# Patient Record
Sex: FEMALE | Race: BLACK OR AFRICAN AMERICAN | NOT HISPANIC OR LATINO | Employment: UNEMPLOYED | URBAN - METROPOLITAN AREA
[De-identification: names, ages, dates, MRNs, and addresses within clinical notes are randomized per-mention and may not be internally consistent; named-entity substitution may affect disease eponyms.]

---

## 2023-09-22 ENCOUNTER — HOSPITAL ENCOUNTER (INPATIENT)
Dept: DATA CONVERSION | Facility: HOSPITAL | Age: 48
LOS: 8 days | Discharge: HOME | DRG: 100 | End: 2023-09-30
Attending: STUDENT IN AN ORGANIZED HEALTH CARE EDUCATION/TRAINING PROGRAM | Admitting: STUDENT IN AN ORGANIZED HEALTH CARE EDUCATION/TRAINING PROGRAM
Payer: MEDICARE

## 2023-09-22 DIAGNOSIS — G40.901 EPILEPSY, UNSPECIFIED, NOT INTRACTABLE, WITH STATUS EPILEPTICUS (MULTI): ICD-10-CM

## 2023-09-22 DIAGNOSIS — R56.9 SEIZURE (MULTI): Primary | ICD-10-CM

## 2023-09-22 DIAGNOSIS — N13.9 URINARY (TRACT) OBSTRUCTION: ICD-10-CM

## 2023-09-22 LAB — POCT GLUCOSE: 159 MG/DL (ref 74–99)

## 2023-09-22 PROCEDURE — 82947 ASSAY GLUCOSE BLOOD QUANT: CPT

## 2023-09-22 PROCEDURE — 5A1955Z RESPIRATORY VENTILATION, GREATER THAN 96 CONSECUTIVE HOURS: ICD-10-PCS | Performed by: STUDENT IN AN ORGANIZED HEALTH CARE EDUCATION/TRAINING PROGRAM

## 2023-09-22 PROCEDURE — 9990 CHARGE CONVERSION

## 2023-09-23 LAB
ACTIVATED PARTIAL THROMBOPLASTIN TIME IN PPP BY COAGULATION ASSAY: 32 SEC (ref 27–38)
ACTIVATED PARTIAL THROMBOPLASTIN TIME IN PPP BY COAGULATION ASSAY: 32 SEC (ref 27–38)
ALBUMIN (G/DL) IN SER/PLAS: 2.9 G/DL (ref 3.4–5)
ALBUMIN (G/DL) IN SER/PLAS: 3 G/DL (ref 3.4–5)
ALBUMIN (G/DL) IN SER/PLAS: 3.3 G/DL (ref 3.4–5)
ALBUMIN (G/DL) IN SER/PLAS: NORMAL
ANION GAP IN SER/PLAS: 15 MMOL/L (ref 10–20)
ANION GAP IN SER/PLAS: NORMAL
BASOPHILS (10*3/UL) IN BLOOD BY MANUAL COUNT - WAM: 0 X10E9/L (ref 0–0.1)
BASOPHILS/100 LEUKOCYTES IN BLOOD BY MANUAL COUNT - WAM: 0 % (ref 0–2)
BURR CELLS PRESENCE IN BLOOD BY LIGHT MICROSCOPY: NORMAL
CALCIUM (MG/DL) IN SER/PLAS: 7.9 MG/DL (ref 8.6–10.6)
CALCIUM (MG/DL) IN SER/PLAS: 7.9 MG/DL (ref 8.6–10.6)
CALCIUM (MG/DL) IN SER/PLAS: 8.4 MG/DL (ref 8.6–10.6)
CALCIUM (MG/DL) IN SER/PLAS: NORMAL
CARBON DIOXIDE, TOTAL (MMOL/L) IN SER/PLAS: 18 MMOL/L (ref 21–32)
CARBON DIOXIDE, TOTAL (MMOL/L) IN SER/PLAS: 19 MMOL/L (ref 21–32)
CARBON DIOXIDE, TOTAL (MMOL/L) IN SER/PLAS: 19 MMOL/L (ref 21–32)
CARBON DIOXIDE, TOTAL (MMOL/L) IN SER/PLAS: NORMAL
CHLORIDE (MMOL/L) IN SER/PLAS: 113 MMOL/L (ref 98–107)
CHLORIDE (MMOL/L) IN SER/PLAS: 113 MMOL/L (ref 98–107)
CHLORIDE (MMOL/L) IN SER/PLAS: 114 MMOL/L (ref 98–107)
CHLORIDE (MMOL/L) IN SER/PLAS: NORMAL
CREATININE (MG/DL) IN SER/PLAS: 1.28 MG/DL (ref 0.5–1.05)
CREATININE (MG/DL) IN SER/PLAS: 1.32 MG/DL (ref 0.5–1.05)
CREATININE (MG/DL) IN SER/PLAS: 1.41 MG/DL (ref 0.5–1.05)
CREATININE (MG/DL) IN SER/PLAS: NORMAL
EOSINOPHILS (10*3/UL) IN BLOOD BY MANUAL COUNT - WAM: 0 X10E9/L (ref 0–0.7)
EOSINOPHILS/100 LEUKOCYTES IN BLOOD BY MANUAL COUNT - WAM: 0 % (ref 0–6)
ERYTHROCYTE DISTRIBUTION WIDTH (RATIO) BY AUTOMATED COUNT: 12.7 % (ref 11.5–14.5)
ERYTHROCYTE DISTRIBUTION WIDTH (RATIO) BY AUTOMATED COUNT: 12.9 % (ref 11.5–14.5)
ERYTHROCYTE MEAN CORPUSCULAR HEMOGLOBIN CONCENTRATION (G/DL) BY AUTOMATED: 31.5 G/DL (ref 32–36)
ERYTHROCYTE MEAN CORPUSCULAR HEMOGLOBIN CONCENTRATION (G/DL) BY AUTOMATED: 33.2 G/DL (ref 32–36)
ERYTHROCYTE MEAN CORPUSCULAR VOLUME (FL) BY AUTOMATED COUNT: 93 FL (ref 80–100)
ERYTHROCYTE MEAN CORPUSCULAR VOLUME (FL) BY AUTOMATED COUNT: 95 FL (ref 80–100)
ERYTHROCYTES (10*6/UL) IN BLOOD BY AUTOMATED COUNT: 4.2 X10E12/L (ref 4–5.2)
ERYTHROCYTES (10*6/UL) IN BLOOD BY AUTOMATED COUNT: 4.51 X10E12/L (ref 4–5.2)
GFR FEMALE: 45 ML/MIN/1.73M2
GFR FEMALE: 49 ML/MIN/1.73M2
GFR FEMALE: 51 ML/MIN/1.73M2
GFR FEMALE: NORMAL
GFR MALE: NORMAL
GLUCOSE (MG/DL) IN SER/PLAS: 192 MG/DL (ref 74–99)
GLUCOSE (MG/DL) IN SER/PLAS: 202 MG/DL (ref 74–99)
GLUCOSE (MG/DL) IN SER/PLAS: 77 MG/DL (ref 74–99)
GLUCOSE (MG/DL) IN SER/PLAS: NORMAL
HEMATOCRIT (%) IN BLOOD BY AUTOMATED COUNT: 39.1 % (ref 36–46)
HEMATOCRIT (%) IN BLOOD BY AUTOMATED COUNT: 42.9 % (ref 36–46)
HEMOGLOBIN (G/DL) IN BLOOD: 13 G/DL (ref 12–16)
HEMOGLOBIN (G/DL) IN BLOOD: 13.5 G/DL (ref 12–16)
IMMATURE GRANULOCYTES/100 LEUKOCYTES IN BLOOD BY AUTOMATED COUNT: 2.1 % (ref 0–0.9)
INR IN PPP BY COAGULATION ASSAY: 1.3 (ref 0.9–1.1)
INR IN PPP BY COAGULATION ASSAY: 1.3 (ref 0.9–1.1)
KEPPRA: 91 UG/ML (ref 10–40)
LEUKOCYTES (10*3/UL) IN BLOOD BY AUTOMATED COUNT: 17.2 X10E9/L (ref 4.4–11.3)
LEUKOCYTES (10*3/UL) IN BLOOD BY AUTOMATED COUNT: 22.3 X10E9/L (ref 4.4–11.3)
LYMPHOCYTES (10*3/UL) IN BLOOD BY MANUAL COUNT - WAM: 0.18 X10E9/L (ref 1.2–4.8)
LYMPHOCYTES/100 LEUKOCYTES IN BLOOD BY MANUAL COUNT - WAM: 0.8 % (ref 13–44)
MAGNESIUM (MG/DL) IN SER/PLAS: 1.68 MG/DL (ref 1.6–2.4)
MAGNESIUM (MG/DL) IN SER/PLAS: 1.79 MG/DL (ref 1.6–2.4)
METAMYELOCYTES (10*3/UL) IN BLOOD BY MANUAL COUNT - WAM: 0.2 X10E9/L (ref 0–0)
METAMYELOCYTES/100 LEUKOCYTES IN BLOOD BY MANUAL COUNT - WAM: 0.9 % (ref 0–0)
MONOCYTES (10*3/UL) IN BLOOD BY MANUAL COUNT - WAM: 0.76 X10E9/L (ref 0.1–1)
MONOCYTES/100 LEUKOCYTES IN BLOOD BY MANUAL COUNT - WAM: 3.4 % (ref 2–10)
NEUTROPHILS (SEGS+BANDS) (10*3/UL) MANUAL COUNT - WAM: 21.16 X10E9/L (ref 1.2–7.7)
NRBC (PER 100 WBCS) BY AUTOMATED COUNT: 0 /100 WBC (ref 0–0)
NRBC (PER 100 WBCS) BY AUTOMATED COUNT: 0 /100 WBC (ref 0–0)
PHOSPHATE (MG/DL) IN SER/PLAS: 1.9 MG/DL (ref 2.5–4.9)
PHOSPHATE (MG/DL) IN SER/PLAS: 2.3 MG/DL (ref 2.5–4.9)
PHOSPHATE (MG/DL) IN SER/PLAS: 2.4 MG/DL (ref 2.5–4.9)
PHOSPHATE (MG/DL) IN SER/PLAS: NORMAL
PLATELETS (10*3/UL) IN BLOOD AUTOMATED COUNT: 134 X10E9/L (ref 150–450)
PLATELETS (10*3/UL) IN BLOOD AUTOMATED COUNT: 171 X10E9/L (ref 150–450)
POC CALCIUM IONIZED (MMOL/L) IN BLOOD: 1.14 MMOL/L (ref 1.1–1.33)
POC CALCIUM IONIZED (MMOL/L) IN BLOOD: 1.14 MMOL/L (ref 1.1–1.33)
POCT ANION GAP, ARTERIAL: 10 MMOL/L (ref 10–25)
POCT BASE EXCESS, ARTERIAL: -4.5 MMOL/L (ref -2–3)
POCT CHLORIDE, ARTERIAL: 114 MMOL/L (ref 98–107)
POCT GLUCOSE, ARTERIAL: 87 MG/DL (ref 74–99)
POCT GLUCOSE: 156 MG/DL (ref 74–99)
POCT GLUCOSE: 171 MG/DL (ref 74–99)
POCT GLUCOSE: 58 MG/DL (ref 74–99)
POCT GLUCOSE: 72 MG/DL (ref 74–99)
POCT GLUCOSE: 74 MG/DL (ref 74–99)
POCT GLUCOSE: 87 MG/DL (ref 74–99)
POCT GLUCOSE: 90 MG/DL (ref 74–99)
POCT GLUCOSE: 94 MG/DL (ref 74–99)
POCT HCO3, ARTERIAL: 18.3 MMOL/L (ref 22–26)
POCT HEMATOCRIT, ARTERIAL: 38 % (ref 36–46)
POCT HEMOGLOBIN, ARTERIAL: 12.7 G/DL (ref 12–16)
POCT IONIZED CALCIUM, ARTERIAL: 1.14 MMOL/L (ref 1.1–1.33)
POCT LACTATE, ARTERIAL: 0.7 MMOL/L (ref 0.4–2)
POCT OXY HEMOGLOBIN, ARTERIAL: 97.9 % (ref 94–98)
POCT PCO2, ARTERIAL: 27 MMHG (ref 38–42)
POCT PH, ARTERIAL: 7.44 (ref 7.38–7.42)
POCT PO2, ARTERIAL: 145 MMHG (ref 85–95)
POCT POTASSIUM, ARTERIAL: 3.4 MMOL/L (ref 3.5–5.3)
POCT SO2, ARTERIAL: 100 % (ref 94–100)
POCT SODIUM, ARTERIAL: 139 MMOL/L (ref 136–145)
POTASSIUM (MMOL/L) IN SER/PLAS: 3.3 MMOL/L (ref 3.5–5.3)
POTASSIUM (MMOL/L) IN SER/PLAS: 3.5 MMOL/L (ref 3.5–5.3)
POTASSIUM (MMOL/L) IN SER/PLAS: 3.6 MMOL/L (ref 3.5–5.3)
POTASSIUM (MMOL/L) IN SER/PLAS: NORMAL
PROTHROMBIN TIME (PT) IN PPP BY COAGULATION ASSAY: 14.1 SEC (ref 9.8–12.8)
PROTHROMBIN TIME (PT) IN PPP BY COAGULATION ASSAY: 15 SEC (ref 9.8–12.8)
RBC MORPHOLOGY IN BLOOD: NORMAL
SEGMENTED NEUTROPHILS (10*3/UL) BLOOD MANUAL - WAM: 21.16 X10E9/L (ref 1.2–7)
SEGMENTED NEUTROPHILS/100 LEUKOCYTES BY MANUAL COUNT -: 94.9 % (ref 40–80)
SODIUM (MMOL/L) IN SER/PLAS: 143 MMOL/L (ref 136–145)
SODIUM (MMOL/L) IN SER/PLAS: 143 MMOL/L (ref 136–145)
SODIUM (MMOL/L) IN SER/PLAS: 144 MMOL/L (ref 136–145)
SODIUM (MMOL/L) IN SER/PLAS: NORMAL
TOPIRAMATE LEVEL: 12.8 UG/ML (ref 2–25)
UREA NITROGEN (MG/DL) IN SER/PLAS: 18 MG/DL (ref 6–23)
UREA NITROGEN (MG/DL) IN SER/PLAS: 20 MG/DL (ref 6–23)
UREA NITROGEN (MG/DL) IN SER/PLAS: 21 MG/DL (ref 6–23)
UREA NITROGEN (MG/DL) IN SER/PLAS: NORMAL

## 2023-09-23 PROCEDURE — 80177 DRUG SCRN QUAN LEVETIRACETAM: CPT | Mod: 90

## 2023-09-23 PROCEDURE — 82805 BLOOD GASES W/O2 SATURATION: CPT

## 2023-09-23 PROCEDURE — 95716 VEEG EA 12-26HR CONT MNTR: CPT

## 2023-09-23 PROCEDURE — 83605 ASSAY OF LACTIC ACID: CPT

## 2023-09-23 PROCEDURE — 95700 EEG CONT REC W/VID EEG TECH: CPT

## 2023-09-23 PROCEDURE — 85027 COMPLETE CBC AUTOMATED: CPT

## 2023-09-23 PROCEDURE — 85610 PROTHROMBIN TIME: CPT | Mod: 91

## 2023-09-23 PROCEDURE — 83735 ASSAY OF MAGNESIUM: CPT

## 2023-09-23 PROCEDURE — 71045 X-RAY EXAM CHEST 1 VIEW: CPT

## 2023-09-23 PROCEDURE — 84295 ASSAY OF SERUM SODIUM: CPT | Mod: 91

## 2023-09-23 PROCEDURE — 9990 CHARGE CONVERSION

## 2023-09-23 PROCEDURE — 82330 ASSAY OF CALCIUM: CPT | Mod: 91

## 2023-09-23 PROCEDURE — 87081 CULTURE SCREEN ONLY: CPT

## 2023-09-23 PROCEDURE — 84132 ASSAY OF SERUM POTASSIUM: CPT | Mod: 91

## 2023-09-23 PROCEDURE — 82435 ASSAY OF BLOOD CHLORIDE: CPT | Mod: 91

## 2023-09-23 PROCEDURE — 80201 ASSAY OF TOPIRAMATE: CPT | Mod: 90

## 2023-09-23 PROCEDURE — 85730 THROMBOPLASTIN TIME PARTIAL: CPT | Mod: 91

## 2023-09-23 PROCEDURE — 94002 VENT MGMT INPAT INIT DAY: CPT

## 2023-09-23 PROCEDURE — 85018 HEMOGLOBIN: CPT | Mod: 91

## 2023-09-23 PROCEDURE — 80069 RENAL FUNCTION PANEL: CPT | Mod: 91

## 2023-09-23 PROCEDURE — 87040 BLOOD CULTURE FOR BACTERIA: CPT | Mod: 59

## 2023-09-23 PROCEDURE — 82947 ASSAY GLUCOSE BLOOD QUANT: CPT | Mod: 91

## 2023-09-24 LAB
ACTIVATED PARTIAL THROMBOPLASTIN TIME IN PPP BY COAGULATION ASSAY: 34 SEC (ref 27–38)
ACTIVATED PARTIAL THROMBOPLASTIN TIME IN PPP BY COAGULATION ASSAY: NORMAL
ACTIVATED PARTIAL THROMBOPLASTIN TIME IN PPP BY COAGULATION ASSAY: NORMAL
ALBUMIN (G/DL) IN SER/PLAS: 2.8 G/DL (ref 3.4–5)
ALBUMIN (G/DL) IN SER/PLAS: NORMAL
ALBUMIN (G/DL) IN SER/PLAS: NORMAL
ANION GAP IN SER/PLAS: 13 MMOL/L (ref 10–20)
ANION GAP IN SER/PLAS: NORMAL
ANION GAP IN SER/PLAS: NORMAL
CALCIUM (MG/DL) IN SER/PLAS: 7.7 MG/DL (ref 8.6–10.6)
CALCIUM (MG/DL) IN SER/PLAS: NORMAL
CALCIUM (MG/DL) IN SER/PLAS: NORMAL
CARBON DIOXIDE, TOTAL (MMOL/L) IN SER/PLAS: 19 MMOL/L (ref 21–32)
CARBON DIOXIDE, TOTAL (MMOL/L) IN SER/PLAS: NORMAL
CARBON DIOXIDE, TOTAL (MMOL/L) IN SER/PLAS: NORMAL
CHLORIDE (MMOL/L) IN SER/PLAS: 114 MMOL/L (ref 98–107)
CHLORIDE (MMOL/L) IN SER/PLAS: NORMAL
CHLORIDE (MMOL/L) IN SER/PLAS: NORMAL
CREATININE (MG/DL) IN SER/PLAS: 1.24 MG/DL (ref 0.5–1.05)
CREATININE (MG/DL) IN SER/PLAS: NORMAL
CREATININE (MG/DL) IN SER/PLAS: NORMAL
ERYTHROCYTE DISTRIBUTION WIDTH (RATIO) BY AUTOMATED COUNT: 13 % (ref 11.5–14.5)
ERYTHROCYTE DISTRIBUTION WIDTH (RATIO) BY AUTOMATED COUNT: NORMAL
ERYTHROCYTE DISTRIBUTION WIDTH (RATIO) BY AUTOMATED COUNT: NORMAL
ERYTHROCYTE MEAN CORPUSCULAR HEMOGLOBIN CONCENTRATION (G/DL) BY AUTOMATED: 31.5 G/DL (ref 32–36)
ERYTHROCYTE MEAN CORPUSCULAR HEMOGLOBIN CONCENTRATION (G/DL) BY AUTOMATED: NORMAL
ERYTHROCYTE MEAN CORPUSCULAR HEMOGLOBIN CONCENTRATION (G/DL) BY AUTOMATED: NORMAL
ERYTHROCYTE MEAN CORPUSCULAR VOLUME (FL) BY AUTOMATED COUNT: 92 FL (ref 80–100)
ERYTHROCYTE MEAN CORPUSCULAR VOLUME (FL) BY AUTOMATED COUNT: NORMAL
ERYTHROCYTE MEAN CORPUSCULAR VOLUME (FL) BY AUTOMATED COUNT: NORMAL
ERYTHROCYTES (10*6/UL) IN BLOOD BY AUTOMATED COUNT: 3.9 X10E12/L (ref 4–5.2)
ERYTHROCYTES (10*6/UL) IN BLOOD BY AUTOMATED COUNT: NORMAL
ERYTHROCYTES (10*6/UL) IN BLOOD BY AUTOMATED COUNT: NORMAL
GFR FEMALE: 53 ML/MIN/1.73M2
GFR FEMALE: NORMAL
GFR FEMALE: NORMAL
GFR MALE: NORMAL
GFR MALE: NORMAL
GLUCOSE (MG/DL) IN SER/PLAS: 99 MG/DL (ref 74–99)
GLUCOSE (MG/DL) IN SER/PLAS: NORMAL
GLUCOSE (MG/DL) IN SER/PLAS: NORMAL
HEMATOCRIT (%) IN BLOOD BY AUTOMATED COUNT: 35.9 % (ref 36–46)
HEMATOCRIT (%) IN BLOOD BY AUTOMATED COUNT: NORMAL
HEMATOCRIT (%) IN BLOOD BY AUTOMATED COUNT: NORMAL
HEMOGLOBIN (G/DL) IN BLOOD: 11.3 G/DL (ref 12–16)
HEMOGLOBIN (G/DL) IN BLOOD: NORMAL
HEMOGLOBIN (G/DL) IN BLOOD: NORMAL
INR IN PPP BY COAGULATION ASSAY: 1.3 (ref 0.9–1.1)
INR IN PPP BY COAGULATION ASSAY: NORMAL
INR IN PPP BY COAGULATION ASSAY: NORMAL
KEPPRA: 73 UG/ML (ref 10–40)
KEPPRA: NORMAL
LEUKOCYTES (10*3/UL) IN BLOOD BY AUTOMATED COUNT: 9.8 X10E9/L (ref 4.4–11.3)
LEUKOCYTES (10*3/UL) IN BLOOD BY AUTOMATED COUNT: NORMAL
LEUKOCYTES (10*3/UL) IN BLOOD BY AUTOMATED COUNT: NORMAL
MAGNESIUM (MG/DL) IN SER/PLAS: 1.71 MG/DL (ref 1.6–2.4)
MAGNESIUM (MG/DL) IN SER/PLAS: NORMAL
MAGNESIUM (MG/DL) IN SER/PLAS: NORMAL
NRBC (PER 100 WBCS) BY AUTOMATED COUNT: 0 /100 WBC (ref 0–0)
NRBC (PER 100 WBCS) BY AUTOMATED COUNT: NORMAL
NRBC (PER 100 WBCS) BY AUTOMATED COUNT: NORMAL
PHOSPHATE (MG/DL) IN SER/PLAS: 1.9 MG/DL (ref 2.5–4.9)
PHOSPHATE (MG/DL) IN SER/PLAS: NORMAL
PHOSPHATE (MG/DL) IN SER/PLAS: NORMAL
PLATELETS (10*3/UL) IN BLOOD AUTOMATED COUNT: 114 X10E9/L (ref 150–450)
PLATELETS (10*3/UL) IN BLOOD AUTOMATED COUNT: NORMAL
PLATELETS (10*3/UL) IN BLOOD AUTOMATED COUNT: NORMAL
POC CALCIUM IONIZED (MMOL/L) IN BLOOD: 1.09 MMOL/L (ref 1.1–1.33)
POC CALCIUM IONIZED (MMOL/L) IN BLOOD: NORMAL
POC CALCIUM IONIZED (MMOL/L) IN BLOOD: NORMAL
POCT GLUCOSE: 102 MG/DL (ref 74–99)
POCT GLUCOSE: 80 MG/DL (ref 74–99)
POCT GLUCOSE: 93 MG/DL (ref 74–99)
POCT GLUCOSE: 98 MG/DL (ref 74–99)
POCT GLUCOSE: 98 MG/DL (ref 74–99)
POCT GLUCOSE: 99 MG/DL (ref 74–99)
POTASSIUM (MMOL/L) IN SER/PLAS: 3.5 MMOL/L (ref 3.5–5.3)
POTASSIUM (MMOL/L) IN SER/PLAS: NORMAL
POTASSIUM (MMOL/L) IN SER/PLAS: NORMAL
PROTHROMBIN TIME (PT) IN PPP BY COAGULATION ASSAY: 15.2 SEC (ref 9.8–12.8)
PROTHROMBIN TIME (PT) IN PPP BY COAGULATION ASSAY: NORMAL
PROTHROMBIN TIME (PT) IN PPP BY COAGULATION ASSAY: NORMAL
SODIUM (MMOL/L) IN SER/PLAS: 142 MMOL/L (ref 136–145)
SODIUM (MMOL/L) IN SER/PLAS: NORMAL
SODIUM (MMOL/L) IN SER/PLAS: NORMAL
STAPH/MRSA SCREEN, CULTURE: NORMAL
TOPIRAMATE LEVEL: NORMAL
UREA NITROGEN (MG/DL) IN SER/PLAS: 14 MG/DL (ref 6–23)
UREA NITROGEN (MG/DL) IN SER/PLAS: NORMAL
UREA NITROGEN (MG/DL) IN SER/PLAS: NORMAL
VANCOMYCIN (UG/ML) IN SER/PLAS: 15.2 UG/ML

## 2023-09-24 PROCEDURE — 82805 BLOOD GASES W/O2 SATURATION: CPT

## 2023-09-24 PROCEDURE — 85610 PROTHROMBIN TIME: CPT

## 2023-09-24 PROCEDURE — 87040 BLOOD CULTURE FOR BACTERIA: CPT | Mod: 59

## 2023-09-24 PROCEDURE — 95716 VEEG EA 12-26HR CONT MNTR: CPT

## 2023-09-24 PROCEDURE — 82330 ASSAY OF CALCIUM: CPT

## 2023-09-24 PROCEDURE — 82947 ASSAY GLUCOSE BLOOD QUANT: CPT | Mod: 91

## 2023-09-24 PROCEDURE — 74018 RADEX ABDOMEN 1 VIEW: CPT | Mod: 76

## 2023-09-24 PROCEDURE — 87081 CULTURE SCREEN ONLY: CPT

## 2023-09-24 PROCEDURE — 83735 ASSAY OF MAGNESIUM: CPT

## 2023-09-24 PROCEDURE — 80202 ASSAY OF VANCOMYCIN: CPT

## 2023-09-24 PROCEDURE — 82435 ASSAY OF BLOOD CHLORIDE: CPT | Mod: 91

## 2023-09-24 PROCEDURE — 83605 ASSAY OF LACTIC ACID: CPT

## 2023-09-24 PROCEDURE — 9990 CHARGE CONVERSION: Mod: 90

## 2023-09-24 PROCEDURE — 84132 ASSAY OF SERUM POTASSIUM: CPT | Mod: 91

## 2023-09-24 PROCEDURE — 85027 COMPLETE CBC AUTOMATED: CPT

## 2023-09-24 PROCEDURE — 85018 HEMOGLOBIN: CPT | Mod: 91

## 2023-09-24 PROCEDURE — 80069 RENAL FUNCTION PANEL: CPT

## 2023-09-24 PROCEDURE — 80177 DRUG SCRN QUAN LEVETIRACETAM: CPT | Mod: 90

## 2023-09-24 PROCEDURE — 84295 ASSAY OF SERUM SODIUM: CPT | Mod: 91

## 2023-09-24 PROCEDURE — 94003 VENT MGMT INPAT SUBQ DAY: CPT

## 2023-09-24 PROCEDURE — 80201 ASSAY OF TOPIRAMATE: CPT | Mod: 90

## 2023-09-24 PROCEDURE — 85730 THROMBOPLASTIN TIME PARTIAL: CPT

## 2023-09-25 LAB
ACTIVATED PARTIAL THROMBOPLASTIN TIME IN PPP BY COAGULATION ASSAY: 34 SEC (ref 27–38)
ALBUMIN (G/DL) IN SER/PLAS: 2.8 G/DL (ref 3.4–5)
ANION GAP IN SER/PLAS: 14 MMOL/L (ref 10–20)
APPEARANCE, URINE: CLEAR
BASOPHILIC STIPPLING PRESENCE IN BLOOD BY LIGHT MICROSCOPY: PRESENT
BASOPHILS (10*3/UL) IN BLOOD BY MANUAL COUNT - WAM: 0 X10E9/L (ref 0–0.1)
BASOPHILS/100 LEUKOCYTES IN BLOOD BY MANUAL COUNT - WAM: 0 % (ref 0–2)
BILIRUBIN, URINE: NEGATIVE
BLOOD, URINE: ABNORMAL
BURR CELLS PRESENCE IN BLOOD BY LIGHT MICROSCOPY: NORMAL
CALCIUM (MG/DL) IN SER/PLAS: 7.7 MG/DL (ref 8.6–10.6)
CARBON DIOXIDE, TOTAL (MMOL/L) IN SER/PLAS: 18 MMOL/L (ref 21–32)
CHLORIDE (MMOL/L) IN SER/PLAS: 114 MMOL/L (ref 98–107)
COLOR, URINE: YELLOW
CREATININE (MG/DL) IN SER/PLAS: 1.24 MG/DL (ref 0.5–1.05)
EOSINOPHILS (10*3/UL) IN BLOOD BY MANUAL COUNT - WAM: 0 X10E9/L (ref 0–0.7)
EOSINOPHILS/100 LEUKOCYTES IN BLOOD BY MANUAL COUNT - WAM: 0 % (ref 0–6)
ERYTHROCYTE DISTRIBUTION WIDTH (RATIO) BY AUTOMATED COUNT: 12.8 % (ref 11.5–14.5)
ERYTHROCYTE MEAN CORPUSCULAR HEMOGLOBIN CONCENTRATION (G/DL) BY AUTOMATED: 33.6 G/DL (ref 32–36)
ERYTHROCYTE MEAN CORPUSCULAR VOLUME (FL) BY AUTOMATED COUNT: 93 FL (ref 80–100)
ERYTHROCYTES (10*6/UL) IN BLOOD BY AUTOMATED COUNT: 3.75 X10E12/L (ref 4–5.2)
GFR FEMALE: 53 ML/MIN/1.73M2
GLUCOSE (MG/DL) IN SER/PLAS: 92 MG/DL (ref 74–99)
GLUCOSE, URINE: NEGATIVE MG/DL
HEMATOCRIT (%) IN BLOOD BY AUTOMATED COUNT: 34.8 % (ref 36–46)
HEMOGLOBIN (G/DL) IN BLOOD: 11.7 G/DL (ref 12–16)
IMMATURE GRANULOCYTES/100 LEUKOCYTES IN BLOOD BY AUTOMATED COUNT: 0.3 % (ref 0–0.9)
INR IN PPP BY COAGULATION ASSAY: 1.2 (ref 0.9–1.1)
KEPPRA: 80 UG/ML (ref 10–40)
KETONES, URINE: ABNORMAL MG/DL
LEUKOCYTE ESTERASE, URINE: ABNORMAL
LEUKOCYTES (10*3/UL) IN BLOOD BY AUTOMATED COUNT: 6.5 X10E9/L (ref 4.4–11.3)
LYMPHOCYTES (10*3/UL) IN BLOOD BY MANUAL COUNT - WAM: 0.28 X10E9/L (ref 1.2–4.8)
LYMPHOCYTES/100 LEUKOCYTES IN BLOOD BY MANUAL COUNT - WAM: 4.3 % (ref 13–44)
MAGNESIUM (MG/DL) IN SER/PLAS: 2 MG/DL (ref 1.6–2.4)
MANUAL DIFFERENTIAL Y/N: ABNORMAL
MONOCYTES (10*3/UL) IN BLOOD BY MANUAL COUNT - WAM: 0.05 X10E9/L (ref 0.1–1)
MONOCYTES/100 LEUKOCYTES IN BLOOD BY MANUAL COUNT - WAM: 0.8 % (ref 2–10)
NEUTROPHILS (SEGS+BANDS) (10*3/UL) MANUAL COUNT - WAM: 6.17 X10E9/L (ref 1.2–7.7)
NITRITE, URINE: NEGATIVE
NRBC (PER 100 WBCS) BY AUTOMATED COUNT: 0 /100 WBC (ref 0–0)
PH, URINE: 6 (ref 5–8)
PHOSPHATE (MG/DL) IN SER/PLAS: 3 MG/DL (ref 2.5–4.9)
PLATELETS (10*3/UL) IN BLOOD AUTOMATED COUNT: 95 X10E9/L (ref 150–450)
POC CALCIUM IONIZED (MMOL/L) IN BLOOD: 1.12 MMOL/L (ref 1.1–1.33)
POCT GLUCOSE: 101 MG/DL (ref 74–99)
POCT GLUCOSE: 110 MG/DL (ref 74–99)
POCT GLUCOSE: 121 MG/DL (ref 74–99)
POCT GLUCOSE: 94 MG/DL (ref 74–99)
POCT GLUCOSE: 96 MG/DL (ref 74–99)
POTASSIUM (MMOL/L) IN SER/PLAS: 3.5 MMOL/L (ref 3.5–5.3)
PROTEIN, URINE: NEGATIVE MG/DL
PROTHROMBIN TIME (PT) IN PPP BY COAGULATION ASSAY: 13 SEC (ref 9.8–12.8)
RBC MORPHOLOGY IN BLOOD: NORMAL
RBC, URINE: 1 /HPF (ref 0–5)
SEGMENTED NEUTROPHILS (10*3/UL) BLOOD MANUAL - WAM: 6.17 X10E9/L (ref 1.2–7)
SEGMENTED NEUTROPHILS/100 LEUKOCYTES BY MANUAL COUNT -: 94.9 % (ref 40–80)
SODIUM (MMOL/L) IN SER/PLAS: 142 MMOL/L (ref 136–145)
SPECIFIC GRAVITY, URINE: 1.01 (ref 1–1.03)
SQUAMOUS EPITHELIAL CELLS, URINE: <1 /HPF
TOPIRAMATE LEVEL: 14.3 UG/ML (ref 2–25)
TRANSITIONAL EPITHELIAL CELLS, URINE: <1 /HPF
UREA NITROGEN (MG/DL) IN SER/PLAS: 10 MG/DL (ref 6–23)
UROBILINOGEN, URINE: <2 MG/DL (ref 0–1.9)
WBC, URINE: 6 /HPF (ref 0–5)

## 2023-09-25 PROCEDURE — 82330 ASSAY OF CALCIUM: CPT

## 2023-09-25 PROCEDURE — 83735 ASSAY OF MAGNESIUM: CPT

## 2023-09-25 PROCEDURE — 71045 X-RAY EXAM CHEST 1 VIEW: CPT

## 2023-09-25 PROCEDURE — 82947 ASSAY GLUCOSE BLOOD QUANT: CPT | Mod: 91

## 2023-09-25 PROCEDURE — 85610 PROTHROMBIN TIME: CPT

## 2023-09-25 PROCEDURE — 80202 ASSAY OF VANCOMYCIN: CPT

## 2023-09-25 PROCEDURE — 94003 VENT MGMT INPAT SUBQ DAY: CPT

## 2023-09-25 PROCEDURE — 97530 THERAPEUTIC ACTIVITIES: CPT | Mod: GP

## 2023-09-25 PROCEDURE — 97166 OT EVAL MOD COMPLEX 45 MIN: CPT | Mod: GO

## 2023-09-25 PROCEDURE — 80201 ASSAY OF TOPIRAMATE: CPT | Mod: 90

## 2023-09-25 PROCEDURE — 80069 RENAL FUNCTION PANEL: CPT | Mod: 91

## 2023-09-25 PROCEDURE — 81001 URINALYSIS AUTO W/SCOPE: CPT

## 2023-09-25 PROCEDURE — 97162 PT EVAL MOD COMPLEX 30 MIN: CPT | Mod: GP

## 2023-09-25 PROCEDURE — 85730 THROMBOPLASTIN TIME PARTIAL: CPT

## 2023-09-25 PROCEDURE — 87086 URINE CULTURE/COLONY COUNT: CPT

## 2023-09-25 PROCEDURE — 85027 COMPLETE CBC AUTOMATED: CPT | Mod: 91

## 2023-09-25 PROCEDURE — 9990 CHARGE CONVERSION

## 2023-09-25 PROCEDURE — 95716 VEEG EA 12-26HR CONT MNTR: CPT

## 2023-09-25 PROCEDURE — 80177 DRUG SCRN QUAN LEVETIRACETAM: CPT | Mod: 90,91

## 2023-09-25 PROCEDURE — 85025 COMPLETE CBC W/AUTO DIFF WBC: CPT

## 2023-09-26 LAB
ALBUMIN (G/DL) IN SER/PLAS: 2.9 G/DL (ref 3.4–5)
ANION GAP IN SER/PLAS: 15 MMOL/L (ref 10–20)
CALCIUM (MG/DL) IN SER/PLAS: 8 MG/DL (ref 8.6–10.6)
CARBON DIOXIDE, TOTAL (MMOL/L) IN SER/PLAS: 15 MMOL/L (ref 21–32)
CHLORIDE (MMOL/L) IN SER/PLAS: 114 MMOL/L (ref 98–107)
CREATININE (MG/DL) IN SER/PLAS: 0.97 MG/DL (ref 0.5–1.05)
ERYTHROCYTE DISTRIBUTION WIDTH (RATIO) BY AUTOMATED COUNT: 12.3 % (ref 11.5–14.5)
ERYTHROCYTE MEAN CORPUSCULAR HEMOGLOBIN CONCENTRATION (G/DL) BY AUTOMATED: 30.7 G/DL (ref 32–36)
ERYTHROCYTE MEAN CORPUSCULAR VOLUME (FL) BY AUTOMATED COUNT: 95 FL (ref 80–100)
ERYTHROCYTES (10*6/UL) IN BLOOD BY AUTOMATED COUNT: 4.31 X10E12/L (ref 4–5.2)
GFR FEMALE: 71 ML/MIN/1.73M2
GLUCOSE (MG/DL) IN SER/PLAS: 127 MG/DL (ref 74–99)
HEMATOCRIT (%) IN BLOOD BY AUTOMATED COUNT: 41 % (ref 36–46)
HEMOGLOBIN (G/DL) IN BLOOD: 12.6 G/DL (ref 12–16)
KEPPRA: 88 UG/ML (ref 10–40)
LEUKOCYTES (10*3/UL) IN BLOOD BY AUTOMATED COUNT: 7 X10E9/L (ref 4.4–11.3)
MAGNESIUM (MG/DL) IN SER/PLAS: 2.01 MG/DL (ref 1.6–2.4)
NRBC (PER 100 WBCS) BY AUTOMATED COUNT: 0 /100 WBC (ref 0–0)
PHOSPHATE (MG/DL) IN SER/PLAS: 3.6 MG/DL (ref 2.5–4.9)
PLATELETS (10*3/UL) IN BLOOD AUTOMATED COUNT: 122 X10E9/L (ref 150–450)
POCT GLUCOSE: 106 MG/DL (ref 74–99)
POCT GLUCOSE: 116 MG/DL (ref 74–99)
POCT GLUCOSE: 118 MG/DL (ref 74–99)
POCT GLUCOSE: 124 MG/DL (ref 74–99)
POCT GLUCOSE: 130 MG/DL (ref 74–99)
POCT GLUCOSE: 137 MG/DL (ref 74–99)
POCT GLUCOSE: 147 MG/DL (ref 74–99)
POTASSIUM (MMOL/L) IN SER/PLAS: 3.9 MMOL/L (ref 3.5–5.3)
SODIUM (MMOL/L) IN SER/PLAS: 140 MMOL/L (ref 136–145)
TOPIRAMATE LEVEL: 13.8 UG/ML (ref 2–25)
UREA NITROGEN (MG/DL) IN SER/PLAS: 11 MG/DL (ref 6–23)
URINE CULTURE: NO GROWTH

## 2023-09-26 PROCEDURE — 9990 CHARGE CONVERSION: Mod: GO

## 2023-09-26 PROCEDURE — 85610 PROTHROMBIN TIME: CPT

## 2023-09-26 PROCEDURE — 94799 UNLISTED PULMONARY SVC/PX: CPT

## 2023-09-26 PROCEDURE — 74018 RADEX ABDOMEN 1 VIEW: CPT

## 2023-09-26 PROCEDURE — 80177 DRUG SCRN QUAN LEVETIRACETAM: CPT | Mod: 90

## 2023-09-26 PROCEDURE — 3E0G76Z INTRODUCTION OF NUTRITIONAL SUBSTANCE INTO UPPER GI, VIA NATURAL OR ARTIFICIAL OPENING: ICD-10-PCS | Performed by: STUDENT IN AN ORGANIZED HEALTH CARE EDUCATION/TRAINING PROGRAM

## 2023-09-26 PROCEDURE — 94668 MNPJ CHEST WALL SBSQ: CPT

## 2023-09-26 PROCEDURE — 80069 RENAL FUNCTION PANEL: CPT | Mod: 91

## 2023-09-26 PROCEDURE — 82947 ASSAY GLUCOSE BLOOD QUANT: CPT

## 2023-09-26 PROCEDURE — 97166 OT EVAL MOD COMPLEX 45 MIN: CPT | Mod: GO

## 2023-09-26 PROCEDURE — 94003 VENT MGMT INPAT SUBQ DAY: CPT

## 2023-09-26 PROCEDURE — 82330 ASSAY OF CALCIUM: CPT

## 2023-09-26 PROCEDURE — 85730 THROMBOPLASTIN TIME PARTIAL: CPT

## 2023-09-26 PROCEDURE — 85025 COMPLETE CBC W/AUTO DIFF WBC: CPT

## 2023-09-26 PROCEDURE — 83735 ASSAY OF MAGNESIUM: CPT | Mod: 91

## 2023-09-26 PROCEDURE — 95716 VEEG EA 12-26HR CONT MNTR: CPT

## 2023-09-26 PROCEDURE — 97530 THERAPEUTIC ACTIVITIES: CPT | Mod: GO

## 2023-09-26 PROCEDURE — 97162 PT EVAL MOD COMPLEX 30 MIN: CPT | Mod: GP

## 2023-09-26 PROCEDURE — 80201 ASSAY OF TOPIRAMATE: CPT | Mod: 90,91

## 2023-09-27 LAB
ALBUMIN (G/DL) IN SER/PLAS: 2.7 G/DL (ref 3.4–5)
ANION GAP IN SER/PLAS: 14 MMOL/L (ref 10–20)
BLOOD CULTURE: NORMAL
CALCIUM (MG/DL) IN SER/PLAS: 7.6 MG/DL (ref 8.6–10.6)
CARBON DIOXIDE, TOTAL (MMOL/L) IN SER/PLAS: 19 MMOL/L (ref 21–32)
CHLORIDE (MMOL/L) IN SER/PLAS: 113 MMOL/L (ref 98–107)
CREATININE (MG/DL) IN SER/PLAS: 1.12 MG/DL (ref 0.5–1.05)
ERYTHROCYTE DISTRIBUTION WIDTH (RATIO) BY AUTOMATED COUNT: 12.4 % (ref 11.5–14.5)
ERYTHROCYTE MEAN CORPUSCULAR HEMOGLOBIN CONCENTRATION (G/DL) BY AUTOMATED: 32.8 G/DL (ref 32–36)
ERYTHROCYTE MEAN CORPUSCULAR VOLUME (FL) BY AUTOMATED COUNT: 91 FL (ref 80–100)
ERYTHROCYTES (10*6/UL) IN BLOOD BY AUTOMATED COUNT: 4.04 X10E12/L (ref 4–5.2)
GFR FEMALE: 60 ML/MIN/1.73M2
GLUCOSE (MG/DL) IN SER/PLAS: 130 MG/DL (ref 74–99)
HEMATOCRIT (%) IN BLOOD BY AUTOMATED COUNT: 36.6 % (ref 36–46)
HEMOGLOBIN (G/DL) IN BLOOD: 12 G/DL (ref 12–16)
KEPPRA: 74 UG/ML (ref 10–40)
KEPPRA: 78 UG/ML (ref 10–40)
LEUKOCYTES (10*3/UL) IN BLOOD BY AUTOMATED COUNT: 9 X10E9/L (ref 4.4–11.3)
MAGNESIUM (MG/DL) IN SER/PLAS: 2 MG/DL (ref 1.6–2.4)
NRBC (PER 100 WBCS) BY AUTOMATED COUNT: 0 /100 WBC (ref 0–0)
PHOSPHATE (MG/DL) IN SER/PLAS: 2.6 MG/DL (ref 2.5–4.9)
PLATELETS (10*3/UL) IN BLOOD AUTOMATED COUNT: 135 X10E9/L (ref 150–450)
POCT GLUCOSE: 106 MG/DL (ref 74–99)
POCT GLUCOSE: 112 MG/DL (ref 74–99)
POCT GLUCOSE: 132 MG/DL (ref 74–99)
POCT GLUCOSE: 149 MG/DL (ref 74–99)
POCT GLUCOSE: 88 MG/DL (ref 74–99)
POCT GLUCOSE: 99 MG/DL (ref 74–99)
POTASSIUM (MMOL/L) IN SER/PLAS: 3.7 MMOL/L (ref 3.5–5.3)
SODIUM (MMOL/L) IN SER/PLAS: 142 MMOL/L (ref 136–145)
TOPIRAMATE LEVEL: 11 UG/ML (ref 2–25)
UREA NITROGEN (MG/DL) IN SER/PLAS: 21 MG/DL (ref 6–23)

## 2023-09-27 PROCEDURE — 80201 ASSAY OF TOPIRAMATE: CPT | Mod: 90

## 2023-09-27 PROCEDURE — 82435 ASSAY OF BLOOD CHLORIDE: CPT | Mod: 91

## 2023-09-27 PROCEDURE — 87086 URINE CULTURE/COLONY COUNT: CPT

## 2023-09-27 PROCEDURE — 87081 CULTURE SCREEN ONLY: CPT

## 2023-09-27 PROCEDURE — 84132 ASSAY OF SERUM POTASSIUM: CPT | Mod: 91

## 2023-09-27 PROCEDURE — 85027 COMPLETE CBC AUTOMATED: CPT

## 2023-09-27 PROCEDURE — 82805 BLOOD GASES W/O2 SATURATION: CPT

## 2023-09-27 PROCEDURE — 80177 DRUG SCRN QUAN LEVETIRACETAM: CPT | Mod: 90

## 2023-09-27 PROCEDURE — 80069 RENAL FUNCTION PANEL: CPT

## 2023-09-27 PROCEDURE — 9990 CHARGE CONVERSION: Mod: 91

## 2023-09-27 PROCEDURE — 84295 ASSAY OF SERUM SODIUM: CPT | Mod: 91

## 2023-09-27 PROCEDURE — 82330 ASSAY OF CALCIUM: CPT

## 2023-09-27 PROCEDURE — 83735 ASSAY OF MAGNESIUM: CPT

## 2023-09-27 PROCEDURE — 85018 HEMOGLOBIN: CPT | Mod: 91

## 2023-09-27 PROCEDURE — 82947 ASSAY GLUCOSE BLOOD QUANT: CPT | Mod: 91

## 2023-09-27 PROCEDURE — 85730 THROMBOPLASTIN TIME PARTIAL: CPT | Mod: 91

## 2023-09-27 PROCEDURE — 85610 PROTHROMBIN TIME: CPT

## 2023-09-27 PROCEDURE — 94799 UNLISTED PULMONARY SVC/PX: CPT

## 2023-09-27 PROCEDURE — 83605 ASSAY OF LACTIC ACID: CPT

## 2023-09-27 PROCEDURE — 81001 URINALYSIS AUTO W/SCOPE: CPT

## 2023-09-27 PROCEDURE — 94003 VENT MGMT INPAT SUBQ DAY: CPT

## 2023-09-28 LAB
ALBUMIN (G/DL) IN SER/PLAS: 2.7 G/DL (ref 3.4–5)
ANION GAP IN SER/PLAS: 14 MMOL/L (ref 10–20)
CALCIUM (MG/DL) IN SER/PLAS: 7.6 MG/DL (ref 8.6–10.6)
CARBON DIOXIDE, TOTAL (MMOL/L) IN SER/PLAS: 18 MMOL/L (ref 21–32)
CHLORIDE (MMOL/L) IN SER/PLAS: 113 MMOL/L (ref 98–107)
CREATININE (MG/DL) IN SER/PLAS: 1.12 MG/DL (ref 0.5–1.05)
ERYTHROCYTE DISTRIBUTION WIDTH (RATIO) BY AUTOMATED COUNT: 12.2 % (ref 11.5–14.5)
ERYTHROCYTE MEAN CORPUSCULAR HEMOGLOBIN CONCENTRATION (G/DL) BY AUTOMATED: 34.6 G/DL (ref 32–36)
ERYTHROCYTE MEAN CORPUSCULAR VOLUME (FL) BY AUTOMATED COUNT: 84 FL (ref 80–100)
ERYTHROCYTES (10*6/UL) IN BLOOD BY AUTOMATED COUNT: 3.99 X10E12/L (ref 4–5.2)
GFR FEMALE: 60 ML/MIN/1.73M2
GLUCOSE (MG/DL) IN SER/PLAS: 87 MG/DL (ref 74–99)
HEMATOCRIT (%) IN BLOOD BY AUTOMATED COUNT: 33.5 % (ref 36–46)
HEMOGLOBIN (G/DL) IN BLOOD: 11.6 G/DL (ref 12–16)
KEPPRA: 90 UG/ML (ref 10–40)
LEUKOCYTES (10*3/UL) IN BLOOD BY AUTOMATED COUNT: 9.1 X10E9/L (ref 4.4–11.3)
MAGNESIUM (MG/DL) IN SER/PLAS: 1.85 MG/DL (ref 1.6–2.4)
NRBC (PER 100 WBCS) BY AUTOMATED COUNT: 0 /100 WBC (ref 0–0)
PHOSPHATE (MG/DL) IN SER/PLAS: 1.6 MG/DL (ref 2.5–4.9)
PLATELETS (10*3/UL) IN BLOOD AUTOMATED COUNT: 148 X10E9/L (ref 150–450)
POCT GLUCOSE: 107 MG/DL (ref 74–99)
POCT GLUCOSE: 73 MG/DL (ref 74–99)
POCT GLUCOSE: 84 MG/DL (ref 74–99)
POTASSIUM (MMOL/L) IN SER/PLAS: 3 MMOL/L (ref 3.5–5.3)
SODIUM (MMOL/L) IN SER/PLAS: 142 MMOL/L (ref 136–145)
TOPIRAMATE LEVEL: 12.2 UG/ML (ref 2–25)
UREA NITROGEN (MG/DL) IN SER/PLAS: 16 MG/DL (ref 6–23)

## 2023-09-28 PROCEDURE — 80177 DRUG SCRN QUAN LEVETIRACETAM: CPT | Mod: 90

## 2023-09-28 PROCEDURE — 97535 SELF CARE MNGMENT TRAINING: CPT | Mod: GO

## 2023-09-28 PROCEDURE — 95700 EEG CONT REC W/VID EEG TECH: CPT

## 2023-09-28 PROCEDURE — 83735 ASSAY OF MAGNESIUM: CPT

## 2023-09-28 PROCEDURE — 36410 VNPNXR 3YR/> PHY/QHP DX/THER: CPT

## 2023-09-28 PROCEDURE — 85027 COMPLETE CBC AUTOMATED: CPT

## 2023-09-28 PROCEDURE — 95716 VEEG EA 12-26HR CONT MNTR: CPT

## 2023-09-28 PROCEDURE — 97530 THERAPEUTIC ACTIVITIES: CPT | Mod: GP

## 2023-09-28 PROCEDURE — C1751 CATH, INF, PER/CENT/MIDLINE: HCPCS

## 2023-09-28 PROCEDURE — 80069 RENAL FUNCTION PANEL: CPT

## 2023-09-28 PROCEDURE — 82947 ASSAY GLUCOSE BLOOD QUANT: CPT | Mod: 91

## 2023-09-28 PROCEDURE — 9990 CHARGE CONVERSION

## 2023-09-28 PROCEDURE — 80201 ASSAY OF TOPIRAMATE: CPT | Mod: 90

## 2023-09-28 PROCEDURE — 97116 GAIT TRAINING THERAPY: CPT | Mod: GP

## 2023-09-28 PROCEDURE — 05H833Z INSERTION OF INFUSION DEVICE INTO LEFT AXILLARY VEIN, PERCUTANEOUS APPROACH: ICD-10-PCS | Performed by: STUDENT IN AN ORGANIZED HEALTH CARE EDUCATION/TRAINING PROGRAM

## 2023-09-28 RX ORDER — ENOXAPARIN SODIUM 100 MG/ML
40 INJECTION SUBCUTANEOUS EVERY 24 HOURS
Status: DISCONTINUED | OUTPATIENT
Start: 2023-09-30 | End: 2023-09-30 | Stop reason: HOSPADM

## 2023-09-28 RX ORDER — TOPIRAMATE 200 MG/1
200 TABLET ORAL 2 TIMES DAILY
Status: DISCONTINUED | OUTPATIENT
Start: 2023-09-30 | End: 2023-09-30 | Stop reason: HOSPADM

## 2023-09-28 RX ORDER — LEVETIRACETAM 500 MG/1
2000 TABLET ORAL NIGHTLY
Status: DISCONTINUED | OUTPATIENT
Start: 2023-09-30 | End: 2023-09-30 | Stop reason: HOSPADM

## 2023-09-28 RX ORDER — DEXTROSE 50 % IN WATER (D50W) INTRAVENOUS SYRINGE
25
Status: DISCONTINUED | OUTPATIENT
Start: 2023-09-30 | End: 2023-09-30 | Stop reason: HOSPADM

## 2023-09-28 RX ORDER — AMOXICILLIN 250 MG
2 CAPSULE ORAL 2 TIMES DAILY
Status: DISCONTINUED | OUTPATIENT
Start: 2023-09-30 | End: 2023-09-28

## 2023-09-28 RX ORDER — HEPARIN SODIUM,PORCINE/PF 10 UNIT/ML
50 SYRINGE (ML) INTRAVENOUS AS NEEDED
Status: DISCONTINUED | OUTPATIENT
Start: 2023-09-30 | End: 2023-09-30 | Stop reason: HOSPADM

## 2023-09-28 RX ORDER — LIDOCAINE HYDROCHLORIDE 10 MG/ML
1 INJECTION INFILTRATION; PERINEURAL ONCE AS NEEDED
Status: DISCONTINUED | OUTPATIENT
Start: 2023-09-30 | End: 2023-09-28

## 2023-09-28 RX ORDER — TAMSULOSIN HYDROCHLORIDE 0.4 MG/1
0.4 CAPSULE ORAL
Status: DISCONTINUED | OUTPATIENT
Start: 2023-09-30 | End: 2023-09-30 | Stop reason: HOSPADM

## 2023-09-28 RX ORDER — AMOXICILLIN 250 MG
2 CAPSULE ORAL 2 TIMES DAILY
Status: DISCONTINUED | OUTPATIENT
Start: 2023-09-30 | End: 2023-09-30 | Stop reason: HOSPADM

## 2023-09-28 RX ORDER — POLYETHYLENE GLYCOL 3350 17 G/17G
17 POWDER, FOR SOLUTION ORAL DAILY PRN
Status: DISCONTINUED | OUTPATIENT
Start: 2023-09-30 | End: 2023-09-30 | Stop reason: HOSPADM

## 2023-09-28 RX ORDER — LEVETIRACETAM 500 MG/1
1500 TABLET ORAL EVERY MORNING
Status: DISCONTINUED | OUTPATIENT
Start: 2023-09-30 | End: 2023-09-30 | Stop reason: HOSPADM

## 2023-09-28 RX ORDER — INSULIN LISPRO 100 [IU]/ML
0-10 INJECTION, SOLUTION INTRAVENOUS; SUBCUTANEOUS EVERY 4 HOURS
Status: DISCONTINUED | OUTPATIENT
Start: 2023-09-30 | End: 2023-09-28

## 2023-09-28 RX ORDER — LIDOCAINE HYDROCHLORIDE 20 MG/ML
1 JELLY TOPICAL EVERY 4 HOURS PRN
Status: DISCONTINUED | OUTPATIENT
Start: 2023-09-30 | End: 2023-09-30 | Stop reason: HOSPADM

## 2023-09-28 RX ORDER — AMOXICILLIN AND CLAVULANATE POTASSIUM 500; 125 MG/1; MG/1
500 TABLET, FILM COATED ORAL EVERY 8 HOURS SCHEDULED
Status: DISCONTINUED | OUTPATIENT
Start: 2023-09-30 | End: 2023-09-30 | Stop reason: HOSPADM

## 2023-09-28 RX ORDER — HEPARIN SODIUM,PORCINE/PF 10 UNIT/ML
50 SYRINGE (ML) INTRAVENOUS EVERY 12 HOURS SCHEDULED
Status: DISCONTINUED | OUTPATIENT
Start: 2023-09-30 | End: 2023-09-30 | Stop reason: HOSPADM

## 2023-09-28 RX ORDER — TOPIRAMATE 200 MG/1
200 TABLET ORAL 2 TIMES DAILY
Status: DISCONTINUED | OUTPATIENT
Start: 2023-09-30 | End: 2023-09-28

## 2023-09-29 LAB
ALBUMIN (G/DL) IN SER/PLAS: 3 G/DL (ref 3.4–5)
ANION GAP IN SER/PLAS: 12 MMOL/L (ref 10–20)
BASOPHILIC STIPPLING PRESENCE IN BLOOD BY LIGHT MICROSCOPY: PRESENT
BASOPHILS (10*3/UL) IN BLOOD BY MANUAL COUNT - WAM: 0 X10E9/L (ref 0–0.1)
BASOPHILS/100 LEUKOCYTES IN BLOOD BY MANUAL COUNT - WAM: 0 % (ref 0–2)
BURR CELLS PRESENCE IN BLOOD BY LIGHT MICROSCOPY: NORMAL
CALCIUM (MG/DL) IN SER/PLAS: 8.1 MG/DL (ref 8.6–10.6)
CARBON DIOXIDE, TOTAL (MMOL/L) IN SER/PLAS: 20 MMOL/L (ref 21–32)
CHLORIDE (MMOL/L) IN SER/PLAS: 114 MMOL/L (ref 98–107)
CREATININE (MG/DL) IN SER/PLAS: 0.96 MG/DL (ref 0.5–1.05)
EOSINOPHILS (10*3/UL) IN BLOOD BY MANUAL COUNT - WAM: 0 X10E9/L (ref 0–0.7)
EOSINOPHILS/100 LEUKOCYTES IN BLOOD BY MANUAL COUNT - WAM: 0 % (ref 0–6)
ERYTHROCYTE DISTRIBUTION WIDTH (RATIO) BY AUTOMATED COUNT: 12.4 % (ref 11.5–14.5)
ERYTHROCYTE MEAN CORPUSCULAR HEMOGLOBIN CONCENTRATION (G/DL) BY AUTOMATED: 32.6 G/DL (ref 32–36)
ERYTHROCYTE MEAN CORPUSCULAR VOLUME (FL) BY AUTOMATED COUNT: 89 FL (ref 80–100)
ERYTHROCYTES (10*6/UL) IN BLOOD BY AUTOMATED COUNT: 4.08 X10E12/L (ref 4–5.2)
GFR FEMALE: 72 ML/MIN/1.73M2
GLUCOSE (MG/DL) IN SER/PLAS: 99 MG/DL (ref 74–99)
HEMATOCRIT (%) IN BLOOD BY AUTOMATED COUNT: 36.2 % (ref 36–46)
HEMOGLOBIN (G/DL) IN BLOOD: 11.8 G/DL (ref 12–16)
IMMATURE GRANULOCYTES/100 LEUKOCYTES IN BLOOD BY AUTOMATED COUNT: 7.3 % (ref 0–0.9)
KEPPRA: 47 UG/ML (ref 10–40)
KEPPRA: 51 UG/ML (ref 10–40)
LEUKOCYTES (10*3/UL) IN BLOOD BY AUTOMATED COUNT: 6.3 X10E9/L (ref 4.4–11.3)
LYMPHOCYTES (10*3/UL) IN BLOOD BY MANUAL COUNT - WAM: 1.23 X10E9/L (ref 1.2–4.8)
LYMPHOCYTES VARIANT/100 LEUKOCYTES IN BLOOD - WAM: 4.2 % (ref 0–2)
LYMPHOCYTES/100 LEUKOCYTES IN BLOOD BY MANUAL COUNT - WAM: 19.5 % (ref 13–44)
MANUAL DIFFERENTIAL Y/N: ABNORMAL
MONOCYTES (10*3/UL) IN BLOOD BY MANUAL COUNT - WAM: 0.37 X10E9/L (ref 0.1–1)
MONOCYTES/100 LEUKOCYTES IN BLOOD BY MANUAL COUNT - WAM: 5.9 % (ref 2–10)
MYELOCYTES (10*3/UL) IN BLOOD BY MANUAL COUNT - WAM: 0.16 X10E9/L (ref 0–0)
MYELOCYTES/100 LEUKOCYTES IN BLOOD BY MANUAL COUNT - WAM: 2.6 % (ref 0–0)
NEUTROPHILS (SEGS+BANDS) (10*3/UL) MANUAL COUNT - WAM: 4.27 X10E9/L (ref 1.2–7.7)
NRBC (PER 100 WBCS) BY AUTOMATED COUNT: 0 /100 WBC (ref 0–0)
PHOSPHATE (MG/DL) IN SER/PLAS: 2.3 MG/DL (ref 2.5–4.9)
PLATELETS (10*3/UL) IN BLOOD AUTOMATED COUNT: 147 X10E9/L (ref 150–450)
POTASSIUM (MMOL/L) IN SER/PLAS: 3.5 MMOL/L (ref 3.5–5.3)
RBC MORPHOLOGY IN BLOOD: NORMAL
SEGMENTED NEUTROPHILS (10*3/UL) BLOOD MANUAL - WAM: 4.27 X10E9/L (ref 1.2–7)
SEGMENTED NEUTROPHILS/100 LEUKOCYTES BY MANUAL COUNT -: 67.8 % (ref 40–80)
SODIUM (MMOL/L) IN SER/PLAS: 142 MMOL/L (ref 136–145)
TOPIRAMATE LEVEL: 10.6 UG/ML (ref 2–25)
UREA NITROGEN (MG/DL) IN SER/PLAS: 11 MG/DL (ref 6–23)
VARIANT LYMPHOCYTES (10*3/UL) BLOOD MANUAL COUNT - WAM: 0.26 X10E9/L (ref 0–0.5)

## 2023-09-29 PROCEDURE — 80069 RENAL FUNCTION PANEL: CPT

## 2023-09-29 PROCEDURE — 80201 ASSAY OF TOPIRAMATE: CPT | Mod: 90

## 2023-09-29 PROCEDURE — 85025 COMPLETE CBC W/AUTO DIFF WBC: CPT

## 2023-09-29 PROCEDURE — 83735 ASSAY OF MAGNESIUM: CPT

## 2023-09-29 PROCEDURE — 80177 DRUG SCRN QUAN LEVETIRACETAM: CPT | Mod: 90

## 2023-09-29 PROCEDURE — 97530 THERAPEUTIC ACTIVITIES: CPT | Mod: GO

## 2023-09-29 PROCEDURE — 82947 ASSAY GLUCOSE BLOOD QUANT: CPT | Mod: 91

## 2023-09-29 PROCEDURE — 97535 SELF CARE MNGMENT TRAINING: CPT | Mod: GO

## 2023-09-29 PROCEDURE — 9990 CHARGE CONVERSION: Mod: 90

## 2023-09-29 PROCEDURE — 97116 GAIT TRAINING THERAPY: CPT | Mod: GP

## 2023-09-29 PROCEDURE — 85027 COMPLETE CBC AUTOMATED: CPT

## 2023-09-30 ENCOUNTER — PHARMACY VISIT (OUTPATIENT)
Dept: PHARMACY | Facility: CLINIC | Age: 48
End: 2023-09-30
Payer: MEDICARE

## 2023-09-30 VITALS
HEIGHT: 66 IN | HEART RATE: 81 BPM | TEMPERATURE: 97.5 F | DIASTOLIC BLOOD PRESSURE: 96 MMHG | SYSTOLIC BLOOD PRESSURE: 150 MMHG | BODY MASS INDEX: 39.61 KG/M2 | RESPIRATION RATE: 30 BRPM | WEIGHT: 246.47 LBS | OXYGEN SATURATION: 97 %

## 2023-09-30 PROBLEM — R56.9 SEIZURE (MULTI): Status: ACTIVE | Noted: 2023-09-30

## 2023-09-30 PROBLEM — F71 MODERATE INTELLECTUAL DISABILITY: Status: ACTIVE | Noted: 2023-09-30

## 2023-09-30 PROBLEM — N13.9 URINARY (TRACT) OBSTRUCTION: Status: ACTIVE | Noted: 2023-09-30

## 2023-09-30 PROBLEM — G40.109 LOCALIZATION-RELATED EPILEPSY (MULTI): Status: ACTIVE | Noted: 2023-09-30

## 2023-09-30 PROBLEM — R56.9 SEIZURES (MULTI): Chronic | Status: ACTIVE | Noted: 2023-09-30

## 2023-09-30 PROBLEM — G40.219 PARTIAL SYMPTOMATIC EPILEPSY WITH COMPLEX PARTIAL SEIZURES, INTRACTABLE, WITHOUT STATUS EPILEPTICUS (MULTI): Chronic | Status: ACTIVE | Noted: 2017-09-07

## 2023-09-30 PROBLEM — N13.9 URINARY (TRACT) OBSTRUCTION: Status: RESOLVED | Noted: 2023-09-30 | Resolved: 2023-09-30

## 2023-09-30 PROBLEM — R62.50 SEVERE DEVELOPMENTAL DELAY: Status: ACTIVE | Noted: 2018-06-07

## 2023-09-30 PROCEDURE — 2500000001 HC RX 250 WO HCPCS SELF ADMINISTERED DRUGS (ALT 637 FOR MEDICARE OP)

## 2023-09-30 PROCEDURE — RXMED WILLOW AMBULATORY MEDICATION CHARGE

## 2023-09-30 PROCEDURE — 80201 ASSAY OF TOPIRAMATE: CPT | Mod: 90

## 2023-09-30 PROCEDURE — 85025 COMPLETE CBC W/AUTO DIFF WBC: CPT

## 2023-09-30 PROCEDURE — 87040 BLOOD CULTURE FOR BACTERIA: CPT | Performed by: STUDENT IN AN ORGANIZED HEALTH CARE EDUCATION/TRAINING PROGRAM

## 2023-09-30 PROCEDURE — 80177 DRUG SCRN QUAN LEVETIRACETAM: CPT | Performed by: STUDENT IN AN ORGANIZED HEALTH CARE EDUCATION/TRAINING PROGRAM

## 2023-09-30 PROCEDURE — 9990 CHARGE CONVERSION

## 2023-09-30 PROCEDURE — 2500000001 HC RX 250 WO HCPCS SELF ADMINISTERED DRUGS (ALT 637 FOR MEDICARE OP): Performed by: STUDENT IN AN ORGANIZED HEALTH CARE EDUCATION/TRAINING PROGRAM

## 2023-09-30 PROCEDURE — 2500000004 HC RX 250 GENERAL PHARMACY W/ HCPCS (ALT 636 FOR OP/ED): Performed by: STUDENT IN AN ORGANIZED HEALTH CARE EDUCATION/TRAINING PROGRAM

## 2023-09-30 PROCEDURE — 80069 RENAL FUNCTION PANEL: CPT

## 2023-09-30 PROCEDURE — 2500000004 HC RX 250 GENERAL PHARMACY W/ HCPCS (ALT 636 FOR OP/ED)

## 2023-09-30 PROCEDURE — 99238 HOSP IP/OBS DSCHRG MGMT 30/<: CPT

## 2023-09-30 PROCEDURE — 80177 DRUG SCRN QUAN LEVETIRACETAM: CPT | Mod: 90

## 2023-09-30 RX ORDER — TAMSULOSIN HYDROCHLORIDE 0.4 MG/1
0.4 CAPSULE ORAL
Qty: 30 CAPSULE | Refills: 3 | Status: SHIPPED | OUTPATIENT
Start: 2023-09-30 | End: 2023-09-30 | Stop reason: SDUPTHER

## 2023-09-30 RX ORDER — TOPIRAMATE 200 MG/1
200 TABLET ORAL 2 TIMES DAILY
Qty: 60 TABLET | Refills: 2 | Status: SHIPPED | OUTPATIENT
Start: 2023-09-30 | End: 2023-12-18 | Stop reason: SDUPTHER

## 2023-09-30 RX ORDER — LEVETIRACETAM 1000 MG/1
2000 TABLET ORAL NIGHTLY
Qty: 60 TABLET | Refills: 2 | Status: SHIPPED | OUTPATIENT
Start: 2023-09-30 | End: 2023-12-11 | Stop reason: SDUPTHER

## 2023-09-30 RX ORDER — TOPIRAMATE 200 MG/1
200 TABLET ORAL 2 TIMES DAILY
Qty: 60 TABLET | Refills: 3 | Status: SHIPPED | OUTPATIENT
Start: 2023-09-30 | End: 2023-09-30 | Stop reason: SDUPTHER

## 2023-09-30 RX ORDER — TAMSULOSIN HYDROCHLORIDE 0.4 MG/1
0.4 CAPSULE ORAL
Qty: 30 CAPSULE | Refills: 2 | Status: SHIPPED | OUTPATIENT
Start: 2023-09-30 | End: 2023-12-29

## 2023-09-30 RX ORDER — LEVETIRACETAM 750 MG/1
1500 TABLET ORAL EVERY MORNING
Qty: 60 TABLET | Refills: 2 | Status: SHIPPED | OUTPATIENT
Start: 2023-09-30 | End: 2023-12-11 | Stop reason: SDUPTHER

## 2023-09-30 RX ORDER — LEVETIRACETAM 750 MG/1
1500 TABLET ORAL EVERY MORNING
Qty: 60 TABLET | Refills: 1 | Status: SHIPPED | OUTPATIENT
Start: 2023-09-30 | End: 2023-09-30 | Stop reason: SDUPTHER

## 2023-09-30 RX ORDER — LEVETIRACETAM 1000 MG/1
2000 TABLET ORAL NIGHTLY
Qty: 60 TABLET | Refills: 3 | Status: SHIPPED | OUTPATIENT
Start: 2023-09-30 | End: 2023-09-30 | Stop reason: SDUPTHER

## 2023-09-30 RX ADMIN — TAMSULOSIN HYDROCHLORIDE 0.4 MG: 0.4 CAPSULE ORAL at 08:20

## 2023-09-30 RX ADMIN — AMOXICILLIN AND CLAVULANATE POTASSIUM 500 MG: 500; 125 TABLET, FILM COATED ORAL at 08:14

## 2023-09-30 RX ADMIN — HEPARIN, PORCINE (PF) 10 UNIT/ML INTRAVENOUS SYRINGE 50 UNITS: at 09:00

## 2023-09-30 RX ADMIN — AMOXICILLIN AND CLAVULANATE POTASSIUM 500 MG: 500; 125 TABLET, FILM COATED ORAL at 14:00

## 2023-09-30 RX ADMIN — TOPIRAMATE 200 MG: 100 TABLET, FILM COATED ORAL at 08:20

## 2023-09-30 RX ADMIN — LEVETIRACETAM 1500 MG: 500 TABLET, FILM COATED ORAL at 08:24

## 2023-09-30 RX ADMIN — ENOXAPARIN SODIUM 40 MG: 40 INJECTION SUBCUTANEOUS at 08:19

## 2023-09-30 ASSESSMENT — PAIN - FUNCTIONAL ASSESSMENT: PAIN_FUNCTIONAL_ASSESSMENT: 0-10

## 2023-09-30 ASSESSMENT — PAIN SCALES - GENERAL: PAINLEVEL_OUTOF10: 0 - NO PAIN

## 2023-09-30 NOTE — PROGRESS NOTES
Service: Epilepsy     Subjective Data:   MILIND ARTIS is a 48 year old Female who is Hospital Day # 2.     NAEO. On prop, norepi.    Objective Data:     Objective Information:      T   P  R  BP   MAP  SpO2   Value  35.7  72  12  93/67   80  98%  Date/Time 9/23 12:00 9/23 8:00 9/23 8:00 9/23 8:00  9/23 7:00 9/23 8:00  Range  (35.6C - 36.6C )  (62 - 99 )  (9 - 21 )  (89 - 168 )/ (57 - 130 )  (67 - 136 )  (98% - 100% )   As of 23-Sep-2023 00:00:00, patient is on 40% oxygen via ventilator assisted.      Pain reported at 9/23 4:00: 0  Pain reported at 9/23 4:00: CPOT    ---- Intake and Output  -----  Mn/Dy/Year Time  Intake   Output  Net  Sep 23, 2023 6:00 am  601.5   470  131    The Intake and Output Totals for the last 24 hours are:      Intake   Output  Net      601   470  131    Physical Exam Narrative:  ·  Physical Exam:    General: intubated, sedated  Neuro  minimally arouses to sternal rub  not following commands   pupils 2-->1   WDX4   no abnormal movements     Recent Lab Results:    Results:    CBC: 9/23/2023 06:07              \     Hgb     /                              \     13.0       /  WBC  ----------------  Plt               17.2 H    ----------------    134 L            /     Hct     \                              /     39.1       \            RBC: 4.20     MCV: 93           RFP: 9/23/2023 08:34  NA+        Cl-     BUN  /                         Canceled    Canceled    Canceled  /  --------------------------------  Glucose                ---------------------------  Canceled    K+     HCO3-   Creat \                         Canceled    Canceled    Canceled  \  Calcium : CanceledAnion Gap : Canceled          Albumin : Canceled     Phos : Canceled The performance characteristics of phosphorus testing in   heparinized plasma have been validated by the individual     laboratory site where testing is performed. Testing    on heparinized plasma is not approved by the FDA;    however,  suc      Coagulation: 9/23/2023 06:07  PT  /                    15.0 H /  -------<    INR          ----------<      1.3 H  PTT\                    32  \                       ---------- Recent Arterial Blood Gas Results----------     9/23/2023 12:18  pO2 145  pH 7.44  pCO2 27  SO2 100  Base Excess -4.5null    Assessment and Plan:   Daily Risk Screen:  ·  Does patient have an indwelling urinary catheter? n/a consulting service   ·  Does patient have a central line? n/a consulting service     Comorbidities:  ·  Comorbidity Other     Code Status:  ·  Code Status Full Code     Assessment:    HPI: Neisha Simon is a 46yo F with PMH TBI, childhood epilepsy. Patient presented to  for further management of SE. She had 2 generalized seizures , was given  Versed 5mg intranasally without aborting seizure. Was brought into [facility] where she was intubated and sedated with propofol, and was loaded with Keppra 4.5g. Per family, she often has breakthrough seizures in the setting of UTI. UA shows UTI, will treat  with vanc/zosyn.  Patient admitted to NSU for further w/u of SE. Currently intubated, sedated, on levo.     #Right posterior temporal lobe epilepsy  #Status epilepticus   - Increase LEV to 2g BID.  - Continue with TOP 200mg BID   - Please LEV level in 9/24 AM   - Please keep patient on vEEG    Rest of care per NSU team     Wilfrid Hernández MD  Neurology PGY-4 l Epilepsy Team  Available on [facility]     Attestation:   Note Completion:  I am a:  Resident/Fellow   Attending Attestation I saw and evaluated the patient.  I personally obtained the key and critical portions of the history and physical exam or was physically present for key and  critical portions performed by the resident/fellow. I reviewed the resident/fellow?s documentation and discussed the patient with the resident/fellow.  I agree with the resident/fellow?s medical decision making as documented in the note.     I personally evaluated the patient on 23-Sep-2023    Comments/ Additional Findings    I saw and examined the patient, and agree with the note above. Minor edits made to reflect additional information. I spent 35 minutes reviewing this  patient's chart, medications, vitals, labs, diagnostic testing, and performing the physical exam, 50% of this time was spent on providing counseling and coordination of care.     Mayda Vogel MD  [facility], [facility]          Electronic Signatures:  Mayda King)  (Signed 24-Sep-2023 15:52)   Authored: Assessment and Plan, Note Completion   Co-Signer: Service, Subjective Data, Objective Data, Assessment and Plan, Note Completion  Wilfrid Hernández (Resident))  (Signed 23-Sep-2023 12:48)   Authored: Service, Subjective Data, Objective Data, Assessment  and Plan, Note Completion      Last Updated: 24-Sep-2023 15:52 by Mayda King (MD)

## 2023-09-30 NOTE — PROGRESS NOTES
Service: Epilepsy     Subjective Data:   MILIND ARTIS is a 48 year old Female who is Hospital Day # 7.     NAEO. Sister was at bedside and said that Ms. Artis is at 75% of her baseline strength (usually can walk with walker and she has not tried walking yet) and also 85% baseline mentation (thinking a  bit slowed).    Objective Data:     Objective Information:      T   P  R  BP   MAP  SpO2   Value  36.5  79  21  123/66   82  99%  Date/Time 9/28 8:00 9/28 7:00 9/28 7:00 9/28 7:00  9/28 7:00 9/28 7:00  Range  (36.3C - 36.9C )  (41 - 106 )  (13 - 36 )  (117 - 169 )/ (66 - 115 )  (82 - 129 )  (95% - 100% )   As of 27-Sep-2023 08:55:00, patient is on 2 L/min of oxygen via room air.  Highest temp of 36.9 C was recorded at 9/27 20:00      Pain reported at 9/28 7:00: 0  Pain reported at 9/28 7:00: 0 = None    ---- Intake and Output  -----  Mn/Dy/Year Time  Intake   Output  Net  Sep 28, 2023 6:00 am  180   0  180  Sep 27, 2023 10:00 pm  80   0  80  Sep 27, 2023 2:00 pm  79.4   0  79    The Intake and Output Totals for the last 24 hours are:      Intake   Output  Net      339   null  null    Physical Exam Narrative:  ·  Physical Exam:    Neuro  moving all four extremities  speaks in simple sentences  eating and drinking        Recent Lab Results:    Results:    CBC: 9/28/2023 01:41              \     Hgb     /                              \     11.6 L    /  WBC  ----------------  Plt               9.1       ----------------    148 L            /     Hct     \                              /     33.5 L    \            RBC: 3.99 L    MCV: 84           RFP: 9/28/2023 01:41  NA+        Cl-     BUN  /                         142    113 H   16  /  --------------------------------  Glucose                ---------------------------  87    K+     HCO3-   Creat \                         3.0 L   18 L    1.12 H \  Calcium : 7.6 LAnion Gap : 14          Albumin : 2.7 L     Phos : 1.6 L      Assessment and Plan:    Daily Risk Screen:  ·  Does patient have an indwelling urinary catheter? n/a consulting service   ·  Does patient have a central line? yes   ·  Central Line Type PICC   ·  Plan for PICC removal today? no   ·  The patient continues to require a PICC for access     Comorbidities:  ·  Comorbidity Other     Code Status:  ·  Code Status Full Code     Assessment:    Neisha Simon is a 47 yo F with PMH TBI, childhood epilepsy, developmental delay. Patient presented to  for further management of SE. She is known to have right  temporal lobe epilepsy and follows with Dr. Vogel. She has had multiple seizures in the past 2/2 UTI.  This time, she had 2 generalized seizures and was found to have UTI and possible urosepsis. Was brought into [facility] where she was intubated and sedated  withith propofol, and was loaded with Keppra 4.5g. Patient was admitted to NSU for further w/u of SE. 9/24 no signs of seizures, continue to wean sedation as able. 9/25 Patient is following commands to squeeze hands as well as lift legs. 9/26 Patient  more somnolent, intermittently follows commands. Once patient is extubed, will titrate AED's as needed. 9/27 patient more awake and following commands. Keppra trough (78) was above goal max of 40. This could be contributing to patient's confusion. Decreased  keppra AM dose to 1.5 g and keeping PM dose at 2g. Patient extubated and doing well on 9/28. Per sister, patient was 75% back to physical strength baseline and 85% back to mental baseline. Patient transferred from NSU to the floor. Keppra levels remained  toxic. Will work on titrating AEDs to appropriate levels. Also stopped Zosyn and replaced it with Augmentin to finish abx treatment of UTI ending on 9/30.    Changes Today:  - stop Zosyn  - start Augmentin TID (end date 9/30)  - continue vEEG  - asked RN to walk patient  - PM keppra trough ordered; if keppra remains elevated will decrease it and increase topomax    #Right temporal lobe  epilepsy  #Status epilepticus, resolved  - Keppra 1500 mg AM and 2000 mg PM  - Continue with TOP 200mg BID   - AM Keppra and Topomax trough levels  - vEEG    #PARI  #Low bicarb, 10 on admission >17>15  Assessment:  - basaeline ~1.1  - Per pt sister, he has chronic urinary retention(mostly functional), needs fq reminder to empty bladder.  Plan:  - Damon catheter d/c 9/25  - Pt still requires intermittent cath, required x9 times.  - Will start Flomax 0.4mg for female LUTS    #UTI  - WBC trend : 22> 17 > wnl  - UA: 112 WBC, +nitrite, mod LE (no Ucx order seen)  - Afebrile  - blood cx NGTD   - MRSA screen negative  - UCx NGTD  Plan:  - (9/23 - 9/28) Zosyn  - (9/23 - 9/26 ) vanc  - Augmentin 500/125mg TID 9/28-9/30  - ID consult for consideration of ppx Abx, recommend no ppx at this time given low concerns of systemic UTI    CODE STATUS: FULL CODE    Ivan Nuñez MD PhD  Neurology PGY-1l Epilepsy Team  Available on [facility]     Attestation:   Note Completion:  I am a:  Resident/Fellow   Attending Attestation I saw and evaluated the patient.  I personally obtained the key and critical portions of the history and physical exam or was physically present for key and  critical portions performed by the resident/fellow. I reviewed the resident/fellow?s documentation and discussed the patient with the resident/fellow.  I agree with the resident/fellow?s medical decision making as documented in the note.     I personally evaluated the patient on 28-Sep-2023   Comments/ Additional Findings    I saw and examined the patient, and agree with the note above. Minor edits made to reflect additional information. I spent 35 minutes were spent  reviewing this patient's chart, medications, vitals, labs, diagnostic testing, and performing the physical exam, 50% of this time was spent on providing counseling and coordination of care.          Electronic Signatures:  Adilia Moore)  (Signed 28-Sep-2023 22:20)   Authored: Note  Completion   Co-Signer: Service, Subjective Data, Objective Data, Assessment and Plan, Note Completion  Ivan Nuñez (Resident))  (Signed 28-Sep-2023 16:02)   Authored: Service, Subjective Data, Objective Data, Assessment  and Plan, Note Completion      Last Updated: 28-Sep-2023 22:20 by Adilia Moore (MD)

## 2023-09-30 NOTE — PROGRESS NOTES
Service: Epilepsy     Subjective Data:   MILIND ARTIS is a 48 year old Female who is Hospital Day # 6.     NAEO. Patient remains intermittently agitated. Per RN, needed to increase dex slightly. More awake and aware today. Follows commands.    On repeat visit, patient was extubated and doing well.    Objective Data:     Objective Information:      T   P  R  BP   MAP  SpO2   Value  36.5  41  14  145/87   105  99%  Date/Time 9/27 8:00 9/27 6:00 9/27 6:00 9/27 6:00  9/27 6:00 9/27 6:00  Range  (35.5C - 36.5C )  (41 - 78 )  (12 - 25 )  (114 - 169 )/ (78 - 131 )  (95 - 142 )  (93% - 100% )   As of 27-Sep-2023 04:00:00, patient is on 30% oxygen via ventilator assisted.      Pain reported at 9/27 4:00: 0  Pain reported at 9/27 4:00: sleeping    ---- Intake and Output  -----  Mn/Dy/Year Time  Intake   Output  Net  Sep 27, 2023 6:00 am  1085.2   500  585  Sep 26, 2023 10:00 pm  1363.2   214  1149  Sep 26, 2023 2:00 pm  922.9   1403  -481    The Intake and Output Totals for the last 24 hours are:      Intake   Output  Net      5012 0989 1194    Physical Exam Narrative:  ·  Physical Exam:    General: extubated  Neuro  pupils equal and reactive to light  follows commands to gives thumbs up bilaterally and also wiggle toes, move eyes  no abnormal movements   gag and cough reflex present      Recent Lab Results:    Results:    CBC: 9/27/2023 04:07              \     Hgb     /                              \     12.0       /  WBC  ----------------  Plt               9.0       ----------------    135 L            /     Hct     \                              /     36.6       \            RBC: 4.04     MCV: 91           RFP: 9/27/2023 04:07  NA+        Cl-     BUN  /                         142    113 H   21  /  --------------------------------  Glucose                ---------------------------  130 H    K+     HCO3-   Creat \                         3.7    19 L   1.12 H  \  Calcium : 7.6 LAnion Gap : 14           Albumin : 2.7 L     Phos : 2.6      Assessment and Plan:   Daily Risk Screen:  ·  Does patient have an indwelling urinary catheter? n/a consulting service   ·  Does patient have a central line? n/a consulting service     Comorbidities:  ·  Comorbidity Other     Code Status:  ·  Code Status Full Code     Assessment:    Neisha Simon is a 48yo F with PMH TBI, childhood epilepsy. Patient presented to  for further management of SE. She is known to have right temporal lobe epilepsy  and follows with Dr. Vogel. She has had multiple seizures in the past 2/2 UTI.  This time, she had 2 generalized seizures and was found to have UTI and possible urosepsis. Was brought into [facility] where she was intubated and sedated with propofol, and  was loaded with Keppra 4.5g. Patient was admitted to NSU for further w/u of SE. 9/24 no signs of seizures, continue to wean sedation as able. 9/25 Patient is following commands to squeeze hands as well as lift legs. 9/26 Patient more somnolent, intermittently  follows commands. Once patient is extubed, will titrate AED's as needed. 9/27 patient more awake and following commands. Keppra trough (78) was above goal max of 40. This could be contributing to patient's confusion. Recommend decreasing Keppra AM dose  to 1.5 g and keeping PM dose at 2g.    Changes Today:  - decrease Keppra AM dose to 1.5 g; keep PM dose at 2g  - continue Topomax 200 mg BID  - continue vEEG  - please obtain AM trough level of Keppra and Topomax    #Right temporal lobe epilepsy  #Status epilepticus, resolved  - Keppra 1500 mg AM and 2000 mg PM  - Continue with TOP 200mg BID   - AM Keppra and Topomax trough levels  - Please keep patient on vEEG    Rest of care per NSU team     Ivan Nuñez MD PhD  Neurology PGY-1l Epilepsy Team  [facility]     Attestation:   Note Completion:  I am a:  Resident/Fellow   Attending Attestation I saw and evaluated the patient.  I personally obtained the key and critical portions  of the history and physical exam or was physically present for key and  critical portions performed by the resident/fellow. I reviewed the resident/fellow?s documentation and discussed the patient with the resident/fellow.  I agree with the resident/fellow?s medical decision making as documented in the note.     I personally evaluated the patient on 27-Sep-2023   Comments/ Additional Findings    I saw and examined the patient, and agree with the note above. Minor edits made to reflect additional information. I spent 35 minutes were spent  reviewing this patient's chart, medications, vitals, labs, diagnostic testing, and performing the physical exam, 50% of this time was spent on providing counseling and coordination of care.          Electronic Signatures:  Adilia Moore (MD)  (Signed 28-Sep-2023 22:17)   Authored: Note Completion   Co-Signer: Service, Subjective Data, Objective Data, Assessment and Plan, Note Completion  Ivan Nuñez (Resident))  (Signed 27-Sep-2023 13:17)   Authored: Service, Subjective Data, Objective Data, Assessment  and Plan, Note Completion      Last Updated: 28-Sep-2023 22:17 by Adilia Moore)

## 2023-09-30 NOTE — PROGRESS NOTES
Service: Epilepsy     Subjective Data:   MILIND ARTIS is a 48 year old Female who is Hospital Day # 4.     NAEO. More awake and responsive today. Following commands.    Objective Data:     Objective Information:      T   P  R  BP   MAP  SpO2   Value  35  52  14  104/77   87  97%  Date/Time 9/25 4:00 9/25 7:00 9/25 7:00 9/25 7:00  9/25 7:00 9/25 7:00  Range  (35C - 37.2C )  (52 - 92 )  (10 - 28 )  (78 - 124 )/ (55 - 106 )  (64 - 114 )  (94% - 100% )   As of 24-Sep-2023 08:00:00, patient is on 30% oxygen via ventilator assisted.  Highest temp of 37.2 C was recorded at 9/24 20:00      Pain reported at 9/25 4:00: 0  Pain reported at 9/25 4:00: CPOT    ---- Intake and Output  -----  Mn/Dy/Year Time  Intake   Output  Net  Sep 25, 2023 6:00 am  869   950  -81  Sep 24, 2023 10:00 pm  941.8   60  881  Sep 24, 2023 2:00 pm  681   435  246    The Intake and Output Totals for the last 24 hours are:      Intake   Output  Net      6914 [id number]    Physical Exam Narrative:  ·  Physical Exam:    General: intubated  Neuro  shakes head in response to questions and statements  pupils sluggish but responsive to light  gives thumbs up bilaterally. wiggles toes and lifts legs when asked to bilaterally  no abnormal movements   conjugate gaze intact horizontally and vertically  eye have preference for left gaze but can be driven to contralateral extreme    Recent Lab Results:    Results:    CBC: 9/25/2023 04:40              \     Hgb     /                              \     11.7 L    /  WBC  ----------------  Plt               6.5       ----------------    95 L            /     Hct     \                              /     34.8 L    \            RBC: 3.75 L    MCV: 93           RFP: 9/25/2023 04:40  NA+        Cl-     BUN  /                         142    114 H   10  /  --------------------------------  Glucose                ---------------------------  92    K+     HCO3-   Creat \                         3.5    18 L    1.24 H  \  Calcium : 7.7 LAnion Gap : 14          Albumin : 2.8 L     Phos : 3.0      Coagulation: 9/25/2023 04:40  PT  /                    13.0 H /  -------<    INR          ----------<      1.2 H  PTT\                    34  \                       Assessment and Plan:   Daily Risk Screen:  ·  Does patient have an indwelling urinary catheter? n/a consulting service   ·  Does patient have a central line? n/a consulting service     Comorbidities:  ·  Comorbidity Other     Code Status:  ·  Code Status Full Code     Assessment:    Neisha Simon is a 46yo F with PMH TBI, childhood epilepsy. Patient presented to  for further management of SE. She is known to have right temporal lobe epilepsy  and follows with Dr. Vogel. She has had multiple seizures in the past 2/2 UTI.  This time, she had 2 generalized seizures and was found to have UTI and possible urosepsis. Was brought into [facility] where she was intubated and sedated with propofol, and  was loaded with Keppra 4.5g. Patient was admitted to NSU for further w/u of SE. 9/24 no signs of seizures, continue to wean sedation as able. 9/25 Patient is following commands to squeeze hands as well as lift legs. Per primary team, considering extubation  today.    Changes Today:  - transition AEDs to PO as able  - continue Keppra 2g  - continue Topomax 200 mg BID  - continue vEEG  - please obtain AM trough level of Keppra and Topomax    #Right temporal lobe epilepsy    #Status epilepticus, resolved  - continue Keppra 2g BID.  - Continue with TOP 200mg BID   - AM Keppra and Topomax level  - Please keep patient on vEEG    Rest of care per NSU team     Ivan Nuñez MD PhD  Neurology PGY-1l Epilepsy Team  Available on [employer]     Attestation:   Note Completion:  I am a:  Resident/Fellow   Attending Attestation I saw and evaluated the patient.  I personally obtained the key and critical portions of the history and physical exam or was physically present for key and  critical  portions performed by the resident/fellow. I reviewed the resident/fellow?s documentation and discussed the patient with the resident/fellow.  I agree with the resident/fellow?s medical decision making as documented in the note.     I personally evaluated the patient on 25-Sep-2023   Comments/ Additional Findings    I saw and examined the patient, and agree with the note above. Minor edits made to reflect additional information. I spent 35 minutes were spent  reviewing this patient's chart, medications, vitals, labs, diagnostic testing, and performing the physical exam, 50% of this time was spent on providing counseling and coordination of care.          Electronic Signatures:  Adilia Moore)  (Signed 25-Sep-2023 21:00)   Authored: Assessment and Plan, Note Completion   Co-Signer: Service, Subjective Data, Objective Data, Assessment and Plan, Note Completion  Ivan Nuñez (Resident))  (Signed 25-Sep-2023 10:11)   Authored: Service, Subjective Data, Objective Data, Assessment  and Plan, Note Completion      Last Updated: 25-Sep-2023 21:00 by Adilia Moore)

## 2023-09-30 NOTE — PROGRESS NOTES
Subjective Data:   ID Statement:  MILIND ARTIS is a 48 year old Female who is Hospital Day # 5 and ICU Day #5.     LYNETTE.    Objective Data:     ·  Objective Information      T   P  R  BP   MAP  SpO2   Value  36.2  44  15  157/91   109  99%  Date/Time 9/26 4:00 9/26 4:00 9/26 4:00 9/26 4:00  9/26 4:00 9/26 4:00  Range  (35C - 36.2C )  (43 - 91 )  (12 - 23 )  (102 - 170 )/ (77 - 111 )  (87 - 129 )  (92% - 100% )   As of 26-Sep-2023 04:00:00, patient is on 30% oxygen via ventilator assisted.      Pain reported at 9/26 4:00: 0  Pain reported at 9/25 16:00: unable to assess;  CPOT      ---- Intake and Output  -----  Mn/Dy/Year Time  Intake   Output  Net  Sep 26, 2023 6:00 am  737.1   800  -63  Sep 25, 2023 10:00 pm  767.2   1211  -444  Sep 25, 2023 2:00 pm  891.8   0  891    The Intake and Output Totals for the last 24 hours are:      Intake   Output  Net      2396 2011  385              Vent Settings  9/26 4:20 Modes  AC,  VC,  autoflow  9/26 4:20 Rate Set (breaths/min)  14  9/26 4:20 Tidal Volume Set (mL)  360  9/26 4:20 PEEP (cm H2O)  0  9/26 4:20 FiO2 (%)  30  9/25 15:33 Pressure Support (cm H2O)  8    Vent Data  9/26 4:20 Start Date  22-Sep-2023  9/26 4:20 Start Time  23:24  9/26 4:20 Ventilator Days and Hours  3 Day(s) 4 Hours    Vent Weaning  9/25 10:55 Spontaneous Respiratory Rate (breaths/min)  26  9/25 10:55 Spontaneous Tidal Volume (mL)  230  9/25 10:55 RSBI (f/VT)  113  9/25 10:55 Spontaneous Minute Ventilation (L)  5.98  9/25 10:55 Negative Inspiratory Force (enter negative value) (cm H2O)  -27    Non-Invasive  9/26 4:20 High Inspiratory Pressure (cm H2O)  40    Physical Exam Narrative:  ·  Physical Exam:    General: intubated, sedated  Chest: CTAB, RRR  GI: distended, soft, mildly tender  Neuro  minimally arouses to sternal rub  when stimulated, wiggles a lot in bed   following simple commands  pupils 2-->1   WDX4   no abnormal movements     Allergies:       Allergies:  ·  Dilantin :  Unknown    Medications:    Medications:          Continuous Medications       --------------------------------    1. dexmedeTOMIDine 400 microgram/ NaCL 0.9% 100 mL Premix Infusion with Bolus from Ba.2  mcg/kg/hr  IntraVenous  <Continuous>    2. Sodium Chloride 0.9% Infusion:  1000  mL  IntraVenous  <Continuous>         Scheduled Medications       --------------------------------    1. dexAMETHasone Injectable:  4  mg  IntraVenous Push  Every 6 Hours    2. Docusate 50 mg - Senna 8.6 m  tablet(s)  Feeding tube  2 Times a Day    3. Enoxaparin SubCutaneous:  40  mg  SubCutaneous  Every 24 Hours    4. Influenza Virus QUADRIVALENT (Inactive) ADULT Vaccine:  0.5  mL  IntraMuscular  Once    5. Insulin Lispro Mild Corrective Scale:  unit(s)  SubCutaneous  Every 4 Hours    6. levETIRAcetam (KEPPRA) IV Piggy Back:  2000  mg  IntraVenous Piggyback  Every 12 Hours    7. Piperacillin - Tazobactam 3.375 gram/Iso-osmotic 50 mL Premix IVPB:  50  mL  IntraVenous Piggyback  Every 6 Hours    8. Topiramate:  200  mg  Feeding tube  2 Times a Day         PRN Medications       --------------------------------    1. Calcium Gluconate 1 gram/ NaCL 0.67% 50 mL Premix IVPB:  50  mL  IntraVenous Piggyback  Every 6 Hours    2. Calcium Gluconate 2 gram/ NaCL 0.67% 100 mL Premix IVPB:  100  mL  IntraVenous Piggyback  Every 6 Hours    3. Dextrose 50% in Water Injectable:  25  gram(s)  IntraVenous Push  Every 15 Minutes    4. fentaNYL Injectable:  25  microgram(s)  IntraVenous Push  Every 1 Hour    5. Glucagon Injectable:  1  mg  IntraMuscular  Every 15 Minutes    6. Magnesium Sulfate 2 gram/Sterile Water 50 mL Premix Soln:  2  gram(s)  IntraVenous Piggyback  Every 6 Hours    7. Magnesium Sulfate 4 gram/Sterile Water 100 mL Premix Soln:  4  gram(s)  IntraVenous Piggyback  Every 6 Hours    8. Polyethylene Glycol:  17  gram(s)  Oral  Daily    9. Potassium Chloride 20 mEq/Sterile Water 100 mL Premix IVPB:  20  mEq  IntraVenous Piggyback   Every 6 Hours    10. Potassium Chloride Powder Packet:  20  mEq  Feeding tube  Every 6 Hours    11. Potassium Chloride Powder Packet:  40  mEq  Feeding tube  Every 6 Hours        Recent Lab Results:    Results:    CBC: 9/25/2023 23:57              \     Hgb     /                              \     12.6       /  WBC  ----------------  Plt               7.0       ----------------    122 L            /     Hct     \                              /     41.0       \            RBC: 4.31     MCV: 95           RFP: 9/25/2023 23:57  NA+        Cl-     BUN  /                         140    114 H   11  /  --------------------------------  Glucose                ---------------------------  127 H    K+     HCO3-   Creat \                         3.9    15 L   0.97  \  Calcium : 8.0 LAnion Gap : 15          Albumin : 2.9 L     Phos : 3.6          Assessment and Plan:   Daily Risk Screen:  ·  Does patient have a central line? yes   ·  Central Line Type non-tunneled   ·  Plan for non-tunneled central line removal today? yes   ·  Does patient have an indwelling urinary catheter? no   ·  Is the patient intubated? yes   ·  Plan for extubation today? yes     Assessment/Plan:  ·  Assessment/Plan:    Neisha Simon is a 49yo F with PMH TBI, childhood epilepsy. Patient presented to  for further management of SE. She had 2 generalized seizures today, was given  Versed 5mg intranasally without aborting seizure. Was brought into [facility] where she was intubated and sedated with propofol, and was loaded with Keppra 4.5g. Per family, she often has breakthrough seizures in the setting of UTI. UA shows UTI, treated with  vanc/zosyn.  Patient admitted to NSU for further w/u of SE. Initially on propofol requiring pressors, later switched to precedex.     NEUROLOGICAL  #c/f status epilepticus   #TBI, childhood epilepsy   -EEG 9/24: Left temporal epileptogenicity, encephalopathy, no Sz  - EEG 9/25 Left temporal epileptogenicity,  encephalopathy, no Sz + left frontocentral epileptogenicity  - Drug Level (Not trough): Keppra 80 >88, TPM 14.3>13.8  Plan:  - Q1h neuro checks  - Seizures: Continue EEG, daily AED levels   - continue keppra 2g twice a day   - cont home topiramate 200mg BID  - Pain and Sedation: acetaminophen, precedex, weaning down  - PT/OT, Swallow deferred   - will get trough AED levels    CARDIAC  Assessment:  - Cardiac Rhythm: SR on tele  Plan:  - Tele monitoring  - SBP<180, PRN labetalol/hydralazine, Cardene as needed  - MAPs >65     RESPIRATORY   #Respiratory insufficiency in the setting of seizures requiring intubation   Assessment:  Plan:  - daily cpap trials, WTE  - Parameters 9/25: TV 660cc, RR24, NIF -27, but no cuff leak  - s/p 10mg dexamethasone, 4mg q6h x3. 9/25  - 9/26 parameters: , NIF -12, minimal cuff leak per RT. Plan to repeat Dex 10mg then 4q6 x3, extubate tomorrow.    GI:  Assessment:  - Last BM: 9/25  - increased OGT output - 250cc until noon 9/25, then minimal after.  - KUB still showing bowel distension.  Plan:  - Bowel regimen: Colace/senna, miralax as needed   - will d/c low intermittent suction    F/E/N:  #Hypokalemia, hypernatremia improving  Assessment:  Plan:  - Continue LR 75cc/hr  - Replace electrolytes per ICU protocol  - nutrition:  Pivot 1.5 @ 10ml/hr and increase by 10ml/hr every 6-8 hours as tolerated to a goal rate of 30ml/hr.     RENAL:  #PARI  #Low bicarb, 10 on admission >17>15  Assessment:  - basaeline ~1.1  - 1.4-> 0.9  Plan:  - Damon d/griffin 9/24. straight cath x4 afterwards  - Will consider Silodocin for bladder neck relaxation    ENDO:  Assessment:  - BS: wnl  Plan:  - Accucheck with mild corrective SSI q4h  - Hypoglycemia protocol    HEMATOLOGY:  #Thrombocytopenia  -Plt Trend 125 >170-130-95>122  - no clcear baseline, but on last admission on April had thrombocytopenia  - c/w to monitor for now    INFECTIOUS DISEASE  #UTI  - WBC trend : 22> 17 > wnl  - UA: 112 WBC, +nitrite, mod LE  (no Ucx order seen)  - Afebrile  - blood cx NGTD   - MRSA screen negative  Plan:  - (9/23 - ) zosyn   - (9/23 - 9/26 ) vanc  - ID consult for consideration of ppx Abx given her recurrent UTImbreakthrough sz.  - Urine culture pending      Skin/Musculoskeletal:  Assessment:  - No acute issues   Plan:  - Skin per ICU protocol     LINES:  - peripheral IV  - Right femoral central ine, plan to remove after extubation.    PROPHYLAXIS:  - DVT: SCD?s, Lovenox   - GI: not indicated     RESTRAINTS:   -I agree with nursing assessment of the patient?s need for restraints to protect the patient from injury and facilitate healing. The patient is unable to cooperate with the plan of care and at risk for disrupting critical therapy (i.e., removing  medical devices, lines, tubes and/or dressings).  Please see order for specifics.    Restraints can be removed when the patient is able to cooperate with plan of care and allow healing to occur, or the medical devices at risk are discontinued by the medical team.     CODE STATUS: FULL CODE    DISPO PLANNING/ SOCIAL: NSU          Code Status:  ·  Code Status Full Code     Comorbidities:  ·  Comorbidity altered mental status   ·  Altered Mental Status Type encephalopathy   ·  Encephalopathy Type unspecified     Attestation:   Note Completion:  I am a:  Resident/Fellow   Attending Attestation I saw and evaluated the patient.  I personally obtained the key and critical portions of the history and physical exam or was physically present for key and  critical portions performed by the resident/fellow. I reviewed the resident/fellow?s documentation and discussed the patient with the resident/fellow.  I agree with the resident/fellow?s medical decision making as documented in their note  with the exception/addition of the following:   I personally evaluated the patient on 26-Sep-2023   Comments/ Additional Findings    exam 9/26 __:  eyes intermittently open to voice;  intermittently following  commands; 5/5 BUE/BLE    h/o childhood TBI and epilepsy admitted with GTCs; endotracheally intubated on arrival for airway protection  increased home keppra to 2 g BID; continued home topamax; h/o breakthrough seizure with UTI  altered mental status - lethargy with intermittent agitation suspected 2/2 ETT; precedex gtt __ ; consider switching keppra to less lethargy associated AED  if needed ___  switch to oral Keppra once OG output improves __    asymptomatic bradycardia; likely precedex induced    suspected laryngeal edema; treated with dex 9/25-9/26  low TVs but confounded by comfort measures such as precedex for ETT discomfort; so will extubate pending cuff leak check __    one time high OG output 9/26; KUB 9/26 without acute issues    zosyn for UTI started 9/23; awaiting urine culture __; needs ppx for recurrent UTI? __    lines: remove femoral after extubation and stable __  Critical Care Patient I have reviewed and evaluated the most recent data and results, personally examined the patient, and formulated the plan of care as presented above.  This patient  was critically ill and required continued critical care treatment. Teaching and any separately billable procedures are not included in the time calculation.   Billing Provider Critical Care Time 30 minute(s)   Primary Critical Care Issue/Treatment (See Assessment and Plan for greater detail) -- This patient has significantly altered mental status (delirium, encephalopathy, coma, or anoxic brain damage). We are treating with appropriate medications,  hemodynamic support, ventilatory and/or oxygenating support, as indicated, as well as doing intensive diagnostic evaluation and neurological monitoring. Please see assessment and plan above for greater detail.         Electronic Signatures:  Melquiades Corona (Resident))  (Signed 26-Sep-2023 13:39)   Authored: Subjective Data, Objective Data, Assessment  and Plan, Note Completion  Vicente Hadley)   (Signed 26-Sep-2023 09:25)   Authored: Note Completion      Last Updated: 26-Sep-2023 13:39 by Melquiades Corona (Resident))

## 2023-09-30 NOTE — PROGRESS NOTES
Service:   Critical Care Service:  ·  Service NSU     Subjective Data:   ID Statement:  MILIND ARTIS is a 48 year old Female who is Hospital Day # 2 and ICU Day #2.    Objective Data:     ·  Objective Information      T   P  R  BP   MAP  SpO2   Value  36.1  78  14  127/93   105  100%  Date/Time  0:00  3:15  3:15  3:15   3:15  3:15  Range  (35.6C - 36.1C )  (62 - 99 )  (13 - 21 )  (89 - 168 )/ (57 - 130 )  (67 - 136 )  (98% - 100% )   As of 23-Sep-2023 00:00:00, patient is on 40% oxygen via ventilator assisted.      Pain reported at  0:00: 0  Pain reported at  23:15: CPOT        Date:            Weight/Scale Type:  23-Sep-2023 03:33  87.5  kg / bed  22-Sep-2023 23:15  87.5  kg / bed  22-Sep-2023 18:00  110  kg           Physical Exam by System:    Neurological: ECNS. No command following. pupils  midline 3mm-->2mm bilaterally. FS. BUE localized to nox stim. BLE w/d to nox stim.   Cardiovascular: RRR. S1 and S2 normal. No M/G/R.  No JVD.   Respiratory/Thorax: Intubated, AC: 14/400/5/30%.  Symmetrical chest expansion. CTAB. No W/R/R.   Genitourinary: Damon, draining clear yellow urine  to gravity.   Gastrointestinal: Soft, NT/ND. +BS. No rigidity or  guarding. OGT.   Skin: Warm and dry.   Constitutional: Laying in bed, appears comfortable.   Extremities: Pulses palpable x4. No dependent edema.   Psychological: Calm.     [place] T Coma Scale:    Best Eye Response: (E1) none   Best Motor Response: (M5) localizes pain   Best Verbal Response: (V1) none   [place] Score: 7    Intubated or Trached: yes     Allergies:       Allergies:  ·  Dilantin : Unknown    Medications:    Medications:          Continuous Medications       --------------------------------    1. Norepinephrine 8 mg/ D5W 250 mL Infusion:  0.01  mcg/kg/min  IntraVenous  <Continuous>    2. Propofol 10 mg/mL  Infusion with Bolus from Ba  mcg/kg/min  IntraVenous  <Continuous>         Scheduled Medications        --------------------------------    1. Docusate 50 mg - Senna 8.6 m  tablet(s)  Feeding tube  2 Times a Day    2. Enoxaparin SubCutaneous:  40  mg  SubCutaneous  Every 24 Hours    3. Insulin Lispro Mild Corrective Scale:  unit(s)  SubCutaneous  Every 4 Hours    4. levETIRAcetam (KEPPRA) 1500 mg/NaCL 0.54% IVPB Premixed Soln 100 mL:  100  mL  IntraVenous Piggyback  Every 12 Hours    5. Piperacillin - Tazobactam 3.375 gram/Iso-osmotic 50 mL Premix IVPB:  50  mL  IntraVenous Piggyback  Every 6 Hours    6. Topiramate:  200  mg  Feeding tube  2 Times a Day    7. Vancomycin - RPh to Dose - IV Piggy Back:  1  each  As Specified  Variable    8. Vancomycin 750 mg/ D5W IVPB Premixed Soln 150 mL:  750  mg  IntraVenous Piggyback  Every 12 Hours    9. Vancomycin IV Piggy Back:  2  gram(s)  IntraVenous Piggyback  Once         PRN Medications       --------------------------------    1. Dextrose 50% in Water Injectable:  25  gram(s)  IntraVenous Push  Every 15 Minutes    2. Glucagon Injectable:  1  mg  IntraMuscular  Every 15 Minutes    3. Polyethylene Glycol:  17  gram(s)  Oral  Daily        Recent Lab Results:    Results:        I have reviewed these laboratory results:    Glucose_POCT  Trending View      Result 23-Sep-2023 03:32:00  22-Sep-2023 23:35:00  22-Sep-2023 18:16:00    Glucose-POCT 171   H   159   H   145   H        Urinalysis  22-Sep-2023 19:27:00      Result Value    Color, Urine  YELLOW  Reference Range: STRAW,YELLOW    Appearance, Urine  HAZY    Specific Gravity, Urine  1.017    pH, Urine  5.0    Protein, Urine  100(2+)   A   Glucose, Urine  NEGATIVE    Blood, Urine  SMALL(1+)   A   Ketones, Urine  NEGATIVE    Bilirubin, Urine  NEGATIVE    Urobilinogen, Urine  <2.0    Nitrite, Urine  POSITIVE   A   Leukocyte Esterase, Urine  MODERATE(2+)   A     Urinalysis, Microscopic  22-Sep-2023 19:27:00      Result Value    White Cells  112   A   WBC Clumps  RARE    Red Blood Cells  3    Epithelial Cells, Squamous  1     Bacteria, Urine  4+   A   Mucous  1+      Renal Function Panel  22-Sep-2023 18:37:00      Result Value    Lab Comment:  LISA MARTINI,BIC CALLED RB TO ROLANDO MORALES., 09/22/2023 19:47    Glucose, Serum  131   H   NA  146   H   K  2.7   LL   CL  120   H   Bicarbonate, Serum  10   LL   Anion Gap, Serum  19    BUN  20    CREAT  1.21   H   GFR Female  55   A   Calcium, Serum  6.8   L   Phosphorus, Serum  2.7    ALB  3.2   L     Hepatic Function Panel  22-Sep-2023 18:37:00      Result Value    Aspartate Transaminase, Serum  24    ALB  3.2   L   T Bili  0.5    Bilirubin, Serum Direct - Conjugated  0.1    ALKP  83    Alanine Aminotransferase, Serum  29    T Pro  5.2   L     Complete Blood Count + Differential  22-Sep-2023 18:37:00      Result Value    White Blood Cell Count  2.8   L   Red Blood Cell Count  4.71    HGB  14.0    HCT  44.4    MCV  94    MCHC  31.5   L   PLT  125   L   RDW-CV  12.7    Immature Granulocytes %  1.1   H   Differential Comment  SEE MANUAL DIFF      RBC Morphology  22-Sep-2023 18:37:00      Result Value    Red Blood Cell Morphology  See Below    Giant Platelets  Few      Manual Differential Panel  22-Sep-2023 18:37:00      Result Value    % Seg Neutrophil  67.0    % Band Neutrophil  5.0    % Lymphocyte  14.0    % Monocyte  0.0    % Eosinophil  0.0    % Basophil  0.0    % Lymph-Atypical  14.0    Absolute Neutrophil Count (ANC)  2.02    Seg Neutrophil Count  1.88    Band Neutrophil Count  0.14    Lymphocyte, Count  0.39   L   Monocyte, Count  0.00   L   Eosinophil, Count  0.00    Basophil, Count  0.00    Lymph Atypical, Count  0.39      Magnesium, Serum  22-Sep-2023 18:37:00      Result Value    Magnesium, Serum  1.40   L     Venous Full Panel  22-Sep-2023 18:18:00      Result Value    Lab Comment:  PH,LACTV CALLED RB TO JAK GUZMAN, 09/22/2023 18:51    pH, Venous  7.21   LL   pCO2, Venous  29   L   pO2, Venous  140   H   SO2, Venous  99   H   Bicarbonate, Calculated, Venous  11.6   L   HGB,  Venous  14.6    Anion Gap Level, Venous  24    Base Excess, Venous  -14.8   L   Calcium, Ionized Venous  1.21    Chloride Level  112   H   FIO2, Venous  30    Glucose Level, Venous  161   H   HCT CALCULATED, Venous  44.0    Lactate Level, Venous  5.8   HH   OXY HGB, Venous  96.6   H   Patient Temperature, Venous  37.0    Potassium Level, Venous  3.2   L   Sodium Level, Venous  144          Results:        Impression:    1. No acute intracranial abnormality.     2. Stable mild volume loss in the left parietal and occipital lobes.        MACRO:  None.     CT Head without Contrast [Sep 22 2023  8:41PM]      Impression:       1. Endotracheal tube in satisfactory location.  2. NG tube placement tip overlying left upper quadrant gastric air  bubble.  3. Low inspiratory film with right perihilar consolidation and mild  left perihilar edema versus infiltrates..        Signed by Primo Manuel MD     Xray Chest 1 View [Sep 22 2023  8:05PM]      Impression:    No acute pulmonary embolism identified to the segmental level.     Near complete right lower lobe collapse is volume loss and additional  regions of atelectasis. Superimposed infection not entirely excluded.     Medical devices as detailed.                 TH CT Angio Chest for PE [Apr 15 2023  2:35AM]      Assessment and Plan:   Daily Risk Screen:  ·  Does patient have a central line? no   ·  Does patient have an indwelling urinary catheter? yes   ·  Plan for indwelling urinary catheter removal today? yes   ·  Is the patient intubated? yes   ·  Plan for extubation today? no   ·  The patient continues to require intubation because they are unable to protect their airway     Assessment/Plan:  ·  Assessment/Plan:    HPI: Neisha Simon is a 48yo F with PMH TBI, childhood epilepsy. Patient presented to  for further management of SE. She had 2 generalized seizures today, was  given Versed 5mg intranasally without aborting seizure. Was brought into [facility] where she was  intubated and sedated with propofol, and was loaded with Keppra 4.5g. Per family, she often has breakthrough seizures in the setting of UTI. UA +4 bacteria, White  cells 112, Leukocyte esterase moderate, +nitrate. Patient admitted to NSU for further w/u of SE.     NEUROLOGICAL  #c/f status epilepticus   #TBI, childhood epilepsy    Assessment:  - Neurologically: ECNS. No command following. pupils midline 3mm-->2mm bilaterally. FS. BUE localized to nox stim. BLE w/d to nox stim.  Plan:  - Q1h neuro checks  - Seizures: Continue EEG, follow up final reading, daily AED levels, continue home meds keppra 1.5g BID, topiramate 200mg BID  - Pain and Sedation: acetaminophen, fentanyl, propofol  - PT/OT, Swallow deferred     CARDIAC  Assessment:  - Cardiac Rhythm: SR on tele  Plan:  - Tele monitoring  - SBP<180, PRN labetalol/hydralazine, Cardene as needed    RESPIRATORY   #Respiratory insufficiency in the setting of seizures requiring intubation   Assessment:  - Vent: AC: 14/400/5/30%  Plan:  - Ventilator plan: continue intubation today due to poor mental status  - Wean O2 as tolerated, incentive spirometry as able  - Continuous pulse ox   - Maintain O2>92%  - Vent bundle with chlorhexidine q4h    GI:  Assessment:  - Last BM: PTA  Plan:  - Bowel regimen: Colace/senna    F/E/N:  #Hypokalemia, hypernatremia   Assessment:  - Not on IVF  - K 2.6, Na 146  Plan:  - Continue IVF  - Replace electrolytes per ICU protocol  - Diet: NPO, nutrition c/s placed     RENAL:  #PARI  Assessment:  - BUN/Creatinine: 20/1.21  - Andre in place with yellow urine   Plan:  - Continue daily RFP  - Keep andre for accurate I/Os while on pressors     ENDO:  Assessment:  - BS: 145-171  Plan:  - Accucheck with mild corrective SSI q4h  - Hypoglycemia protocol    HEMATOLOGY:  #Thrombocyotsis   Assessment:  - H/H: 14.0/44.4  - platelets: 125  Plan:  - Daily CBC      INFECTIOUS DISEASE  Assessment:  - WBC: 2.8  - Afebrile  Plan:  - Continue Daily CBC  - Pan  culture for Temp>38.4C    Skin/Musculoskeletal:  Assessment:  - No acute issues   Plan:  - Skin per ICU protocol     LINES:  - peripheral IV    PROPHYLAXIS:  - DVT: SCD?s, Lovenox   - GI: not indicated     RESTRAINTS:   -I agree with nursing assessment of the patient?s need for restraints to protect the patient from injury and facilitate healing. The patient is unable to cooperate with the plan of care and at risk for disrupting critical therapy (i.e., removing  medical devices, lines, tubes and/or dressings).  Please see order for specifics.    Restraints can be removed when the patient is able to cooperate with plan of care and allow healing to occur, or the medical devices at risk are discontinued by the medical team.       CODE STATUS: FULL CODE    DISPO PLANNING/ SOCIAL: [facility]  Ne[facility] Int[facility] Unit  Pager [phone], available on doc halo [phone]      Code Status:  ·  Code Status Full Code     Attestation:   Note Completion:  Critical Care Patient I have reviewed and evaluated the most recent data and results, personally examined the patient, and formulated the plan of care as presented above.  This patient  was critically ill and required continued critical care treatment. Teaching and any separately billable procedures are not included in the time calculation.   Billing Provider Critical Care Time 90 minute(s)   Primary Critical Care Issue/Treatment (See Assessment and Plan for greater detail) -- This patient has significantly altered mental status (delirium, encephalopathy, coma, or anoxic brain damage). We are treating with appropriate medications,  hemodynamic support, ventilatory and/or oxygenating support, as indicated, as well as doing intensive diagnostic evaluation and neurological monitoring. Please see assessment and plan above for greater detail.; -- For the nature of the critical condition  and treatment, this documentation has been prepared by the attending  physician/JACKSON- billing provider of these critical care services.         Electronic Signatures:  Booker Wall (APRN-CNP)  (Signed 23-Sep-2023 05:29)   Authored: Service, Subjective Data, Objective Data, Assessment  and Plan, Note Completion      Last Updated: 23-Sep-2023 05:29 by Booker Wall (APRN-CNP)

## 2023-09-30 NOTE — PROGRESS NOTES
Subjective Data:   ID Statement:  MILIND ARTIS is a 48 year old Female who is Hospital Day # 4 and ICU Day #4.     Marisol OJEDA removed yesterday, patient was straight cathed x2 for retention.    Objective Data:     ·  Objective Information        T   P  R  BP   MAP  SpO2   Value  35  57  14  107/87   95  98%  Date/Time  4:00  6:00  6:00  6:00   6:00  6:00  Range  (35C - 37.2C )  (53 - 92 )  (10 - 28 )  (78 - 124 )/ (55 - 106 )  (64 - 114 )  (94% - 100% )   As of 24-Sep-2023 08:00:00, patient is on 30% oxygen via ventilator assisted.  Highest temp of 37.2 C was recorded at  20:00      ---- Intake and Output  -----  Mn/Dy/Year Time  Intake   Output  Net  Sep 25, 2023 6:00 am  869   950  -81  Sep 24, 2023 10:00 pm  941.8   60  881  Sep 24, 2023 2:00 pm  681   435  246    The Intake and Output Totals for the last 24 hours are:      Intake   Output  Net      2491   1445  1046        Vent Settings   4:42 Modes  AC,  VC,  autoflow   4:42 Rate Set (breaths/min)  14   4:42 Tidal Volume Set (mL)  360   4:42 PEEP (cm H2O)  5   4:42 FiO2 (%)  30   20: Pressure Support (cm H2O)  8    Vent Data   4:42 Start Date  22-Sep-2023  9/25 4:42 Start Time  23: 4:42 Ventilator Days and Hours  2 Day(s) 5 Hours      Non-Invasive   4:42 High Inspiratory Pressure (cm H2O)  40    Physical Exam Narrative:  ·  Physical Exam:    General: intubated, sedated  Neuro  minimally arouses to sternal rub  when stimulated, wiggles a lot in bed   following simple commands  pupils 2-->1   WDX4   no abnormal movements     Allergies:       Allergies:  ·  Dilantin : Unknown    Medications:    Medications:          Continuous Medications       --------------------------------    1. dexmedeTOMIDine 400 microgram/ NaCL 0.9% 100 mL Premix Infusion with Bolus from Ba.2  mcg/kg/hr  IntraVenous  <Continuous>    2. Sodium Chloride 0.9% Infusion:  1000  mL  IntraVenous  <Continuous>          Scheduled Medications       --------------------------------    1. Docusate 50 mg - Senna 8.6 m  tablet(s)  Feeding tube  2 Times a Day    2. Enoxaparin SubCutaneous:  40  mg  SubCutaneous  Every 24 Hours    3. Influenza Virus QUADRIVALENT (Inactive) ADULT Vaccine:  0.5  mL  IntraMuscular  Once    4. Insulin Lispro Mild Corrective Scale:  unit(s)  SubCutaneous  Every 4 Hours    5. levETIRAcetam (KEPPRA) IV Piggy Back:  2000  mg  IntraVenous Piggyback  Every 12 Hours    6. Piperacillin - Tazobactam 3.375 gram/Iso-osmotic 50 mL Premix IVPB:  50  mL  IntraVenous Piggyback  Every 6 Hours    7. Topiramate:  200  mg  Feeding tube  2 Times a Day    8. Vancomycin - RPh to Dose - IV Piggy Back:  1  each  As Specified  Variable    9. Vancomycin 750 mg/ D5W IVPB Premixed Soln 150 mL:  750  mg  IntraVenous Piggyback  Every 12 Hours         PRN Medications       --------------------------------    1. Calcium Gluconate 1 gram/ NaCL 0.67% 50 mL Premix IVPB:  50  mL  IntraVenous Piggyback  Every 6 Hours    2. Calcium Gluconate 2 gram/ NaCL 0.67% 100 mL Premix IVPB:  100  mL  IntraVenous Piggyback  Every 6 Hours    3. Dextrose 50% in Water Injectable:  25  gram(s)  IntraVenous Push  Every 15 Minutes    4. Glucagon Injectable:  1  mg  IntraMuscular  Every 15 Minutes    5. Magnesium Sulfate 2 gram/Sterile Water 50 mL Premix Soln:  2  gram(s)  IntraVenous Piggyback  Every 6 Hours    6. Magnesium Sulfate 4 gram/Sterile Water 100 mL Premix Soln:  4  gram(s)  IntraVenous Piggyback  Every 6 Hours    7. Polyethylene Glycol:  17  gram(s)  Oral  Daily    8. Potassium Chloride 20 mEq/Sterile Water 100 mL Premix IVPB:  20  mEq  IntraVenous Piggyback  Every 6 Hours    9. Potassium Chloride Powder Packet:  20  mEq  Feeding tube  Every 6 Hours    10. Potassium Chloride Powder Packet:  40  mEq  Feeding tube  Every 6 Hours        Recent Lab Results:    Results:    CBC: 2023 04:40              \     Hgb     /                               \     11.7 L    /  WBC  ----------------  Plt               6.5       ----------------    95 L            /     Hct     \                              /     34.8 L    \            RBC: 3.75 L    MCV: 93           RFP: 9/25/2023 04:40  NA+        Cl-     BUN  /                         142    114 H   10  /  --------------------------------  Glucose                ---------------------------  92    K+     HCO3-   Creat \                         3.5    18 L   1.24 H  \  Calcium : 7.7 LAnion Gap : 14          Albumin : 2.8 L     Phos : 3.0      Coagulation: 9/25/2023 04:40  PT  /                    13.0 H /  -------<    INR          ----------<      1.2 H  PTT\                    34  \                       Assessment and Plan:   Daily Risk Screen:  ·  Does patient have a central line? no   ·  Does patient have an indwelling urinary catheter? no   ·  Is the patient intubated? yes   ·  Plan for extubation today? yes     Assessment/Plan:  ·  Assessment/Plan:    HPI: Neisha Simon is a 46yo F with PMH TBI, childhood epilepsy. Patient presented to  for further management of SE. She had 2 generalized seizures today, was  given Versed 5mg intranasally without aborting seizure. Was brought into [facility] where she was intubated and sedated with propofol, and was loaded with Keppra 4.5g. Per family, she often has breakthrough seizures in the setting of UTI. UA shows UTI, treated  with vanc/zosyn.  Patient admitted to NSU for further w/u of SE. Initially on propofol requiring pressors, later switched to precedex.     NEUROLOGICAL  #c/f status epilepticus   #TBI, childhood epilepsy   -EEG 9/24: Left temporal epileptogenicity, encephalopathy, no Sz  - Drug Level (Not trough): Keppra 80, TPM 14.3  Plan:  - Q1h neuro checks  - Seizures: Continue EEG, daily AED levels   - continue keppra 2g twice a day   - cont home topiramate 200mg BID  - Pain and Sedation: acetaminophen, precedex   - PT/OT, Swallow deferred      CARDIAC  Assessment:  - Cardiac Rhythm: SR on tele  Plan:  - Tele monitoring  - SBP<180, PRN labetalol/hydralazine, Cardene as needed  - MAPs >65     RESPIRATORY   #Respiratory insufficiency in the setting of seizures requiring intubation   Assessment:  Plan:  - daily cpap trials, WTE  - Parameters today, TV 660cc, RR24, NIF -27, but no cuff leak  - Will start 10mg dexamethasone, then 4mg q6h    GI:  Assessment:  - Last BM: 9/25  Plan:  - Bowel regimen: Colace/senna, miralax as needed     F/E/N:  #Hypokalemia, hypernatremia   Assessment:  - K 2.6, Na 146  Plan:  - Continue NS 75  - Replace electrolytes per ICU protocol  - nutrition:  Pivot 1.5 @ 10ml/hr and increase by 10ml/hr every 6-8 hours as tolerated to a goal rate of 30ml/hr.     RENAL:  #PARI  #Low bicarb, 10 on admission >17 today  Assessment:  - Cr 1.24, basaeline 1.1-1.4   Plan:  - Damon d/griffin 9/24. x2 straigth cath 9/24. Will evaluate post extubation    ENDO:  Assessment:  - BS: ~100  Plan:  - Accucheck with mild corrective SSI q4h  - Hypoglycemia protocol    HEMATOLOGY:  #Thrombocytopenia  -Plt Trend 125 >170-130-95 today  - no clcear baseline, but on last admission on April had thrombocytopenia  - c/w to monitor for now    INFECTIOUS DISEASE  #UTI  - WBC trend : 22> 17 > 6.5 today  - UA: 112 WBC, +nitrite, mod LE (no Ucx order seen)  - Afebrile  - blood cx NGTD   - MRSA screen negative  Plan:  - (9/23 - ) vanc/zosyn   - d/c vanc  - Will consult ID for consideration of ppx Abx given her recurrent UTI and hx multiple breakthrough sz with UTIs      Skin/Musculoskeletal:  Assessment:  - No acute issues   Plan:  - Skin per ICU protocol     LINES:  - peripheral IV    PROPHYLAXIS:  - DVT: SCD?s, Lovenox   - GI: not indicated     RESTRAINTS:   -I agree with nursing assessment of the patient?s need for restraints to protect the patient from injury and facilitate healing. The patient is unable to cooperate with the plan of care and at risk for disrupting  critical therapy (i.e., removing  medical devices, lines, tubes and/or dressings).  Please see order for specifics.    Restraints can be removed when the patient is able to cooperate with plan of care and allow healing to occur, or the medical devices at risk are discontinued by the medical team.     CODE STATUS: FULL CODE    DISPO PLANNING/ SOCIAL: NSU          Code Status:  ·  Code Status Full Code     Comorbidities:  ·  Comorbidity altered mental status   ·  Altered Mental Status Type encephalopathy   ·  Encephalopathy Type unspecified     Attestation:   Note Completion:  I am a:  Resident/Fellow   Attending Attestation I saw and evaluated the patient.  I personally obtained the key and critical portions of the history and physical exam or was physically present for key and  critical portions performed by the resident/fellow. I reviewed the resident/fellow?s documentation and discussed the patient with the resident/fellow.  I agree with the resident/fellow?s medical decision making as documented in their note  with the exception/addition of the following:   I personally evaluated the patient on 25-Sep-2023   Comments/ Additional Findings    exam 9/25 __:  eyes intermittently open to voice; intermittently following commands; 5/5 BUE/BLE    h/o childhood TBI and epilepsy admitted with GTCs; endotracheally intubated on arrival for airway protection  increased home keppra to 2 g BID; continued home topamax; h/o breakthrough seizure with UTI  altered mental status - lethargy with intermittent agitation suspected 2/2 ETT; precedex gtt __ ; consider switching keppra to less lethargy associated AED  if needed ___    soft BP    suspected laryngeal edema; dex 10 mg IV x 1 then 4 mg q6h x 3 __    vanc/zosyn for UTI; awaiting urine culture __  Critical Care Patient I have reviewed and evaluated the most recent data and results, personally examined the patient, and formulated the plan of care as presented above.  This patient   was critically ill and required continued critical care treatment. Teaching and any separately billable procedures are not included in the time calculation.   Billing Provider Critical Care Time 30 minute(s)   Primary Critical Care Issue/Treatment (See Assessment and Plan for greater detail) -- This patient has significantly altered mental status (delirium, encephalopathy, coma, or anoxic brain damage). We are treating with appropriate medications,  hemodynamic support, ventilatory and/or oxygenating support, as indicated, as well as doing intensive diagnostic evaluation and neurological monitoring. Please see assessment and plan above for greater detail.         Electronic Signatures:  Melquiades Corona (MD (Resident))  (Signed 25-Sep-2023 16:15)   Authored: Subjective Data, Objective Data, Assessment  and Plan, Note Completion  Vicente Hadley)  (Signed 25-Sep-2023 10:13)   Authored: Note Completion   Co-Signer: Subjective Data, Objective Data, Assessment and Plan, Note Completion      Last Updated: 25-Sep-2023 16:15 by Melquiades Corona (MD (Resident))

## 2023-09-30 NOTE — PROGRESS NOTES
Subjective Data:   ID Statement:  MILIND ARTIS is a 48 year old Female who is Hospital Day # 3 and ICU Day #3.     Pulled OG tube last night. Multiple failed attempts to replace OG tube this morning. Switched to precedex.    Objective Data:     ·  Objective Information      T   P  R  BP   MAP  SpO2   Value  36  73  10  98/72   81  100%  Date/Time 9/24 8:00 9/24 8:00 9/24 8:00 9/24 8:00  9/24 8:00 9/24 8:00  Range  (35.6C - 36.7C )  (62 - 96 )  (6 - 28 )  (73 - 168 )/ (55 - 130 )  (63 - 136 )  (95% - 100% )   As of 23-Sep-2023 20:00:00, patient is on 30% oxygen via ventilator assisted.      Pain reported at 9/24 4:00: 0  Pain reported at 9/24 4:00: CPOT      ---- Intake and Output  -----  Mn/Dy/Year Time  Intake   Output  Net  Sep 24, 2023 6:00 am  1069.8   550  519  Sep 23, 2023 10:00 pm  1393.4   550  843  Sep 23, 2023 2:00 pm  703.6   490  213    The Intake and Output Totals for the last 24 hours are:      Intake   Output  Net      3166   1590  1576     Drain and tube details (included in I&O totals)  1590 cc      Indwelling Catheter - Urethral( 24-Sep-2023 06:00:00 )     Date:            Weight/Scale Type:  24-Sep-2023 06:00  88.9  kg / bed  24-Sep-2023 00:00  89.4  kg         23-Sep-2023 03:33  87.5  kg / bed  22-Sep-2023 23:15  87.5  kg / bed  22-Sep-2023 18:00  110  kg           Physical Exam Narrative:  ·  Physical Exam:    General: intubated, sedated  Neuro  minimally arouses to sternal rub  when stimulated, wiggles a lot in bed   following simple commands  pupils 2-->1   WDX4   no abnormal movements     Allergies:       Allergies:  ·  Dilantin : Unknown    Recent Lab Results:    Results:    CBC: 9/24/2023 04:16              \     Hgb     /                              \     11.3 L    /  WBC  ----------------  Plt               9.8       ----------------    114 L            /     Hct     \                              /     35.9 L    \            RBC: 3.90 L    MCV: 92           RFP: 9/24/2023  04:16  NA+        Cl-     BUN  /                         142    114 H   14  /  --------------------------------  Glucose                ---------------------------  99    K+     HCO3-   Creat \                         3.5    19 L   1.24 H  \  Calcium : 7.7 LAnion Gap : 13          Albumin : 2.8 L     Phos : 1.9 L      Coagulation: 9/24/2023 04:16  PT  /                    15.2 H /  -------<    INR          ----------<      1.3 H  PTT\                    34  \                       ---------- Recent Arterial Blood Gas Results----------     9/23/2023 12:18  pO2 145  pH 7.44  pCO2 27  SO2 100  Base Excess -4.5null    Assessment and Plan:   Daily Risk Screen:  ·  Does patient have a central line? no   ·  Does patient have an indwelling urinary catheter? yes   ·  Plan for indwelling urinary catheter removal today? yes   ·  Is the patient intubated? yes   ·  Plan for extubation today? yes     Assessment/Plan:  ·  Assessment/Plan:    HPI: Neisha Simon is a 46yo F with PMH TBI, childhood epilepsy. Patient presented to  for further management of SE. She had 2 generalized seizures today, was  given Versed 5mg intranasally without aborting seizure. Was brought into [facility] where she was intubated and sedated with propofol, and was loaded with Keppra 4.5g. Per family, she often has breakthrough seizures in the setting of UTI. UA shows UTI, will  treat with vanc/zosyn.  Patient admitted to NSU for further w/u of SE. Transitioning to precedex this morning. Weaning parameters not adequate enough to extubate today.     NEUROLOGICAL  #c/f status epilepticus   #TBI, childhood epilepsy    Plan:  - Q1h neuro checks  - Seizures: Continue EEG, follow up final reading, daily AED levels   - continue keppra 2g twice a day   - cont home topiramate 200mg BID  - Pain and Sedation: acetaminophen, precedex   - PT/OT, Swallow deferred     CARDIAC  Assessment:  - Cardiac Rhythm: SR on tele  Plan:  - Tele monitoring  - SBP<180, PRN  labetalol/hydralazine, Cardene as needed  - MAPs >65     RESPIRATORY   #Respiratory insufficiency in the setting of seizures requiring intubation   Assessment:  - Vent: AC: 14/400/5/30%  Plan:  - Ventilator plan: continue intubation today due to poor mental status  - daily cpap trials     GI:  Assessment:  - Last BM: 9/24  Plan:  - Bowel regimen: Colace/senna, miralax as needed     F/E/N:  #Hypokalemia, hypernatremia   Assessment:  - K 2.6, Na 146  Plan:  - Continue NS 75  - Replace electrolytes per ICU protocol  - nutrition:  Pivot 1.5 @ 10ml/hr and increase by 10ml/hr every 6-8 hours as tolerated to a goal rate of 30ml/hr.     RENAL:  #PARI  Assessment:  - Cr 1.24, basaeline 1.1-1.4   Plan:  - dc andre     ENDO:  Assessment:  - BS: 145-171  Plan:  - Accucheck with mild corrective SSI q4h  - Hypoglycemia protocol    HEMATOLOGY:  #leukocytosis  - see ID    INFECTIOUS DISEASE  #UTI  - WBC 22 (up from 17.2)   - UA: 112 WBC, +nitrite, mod LE  - Afebrile  - no urine culture yet   - blood cx NGTD   Plan:  - (9/23 - ) vanc/zosyn     Skin/Musculoskeletal:  Assessment:  - No acute issues   Plan:  - Skin per ICU protocol     LINES:  - peripheral IV    PROPHYLAXIS:  - DVT: SCD?s, Lovenox   - GI: not indicated     RESTRAINTS:   -I agree with nursing assessment of the patient?s need for restraints to protect the patient from injury and facilitate healing. The patient is unable to cooperate with the plan of care and at risk for disrupting critical therapy (i.e., removing  medical devices, lines, tubes and/or dressings).  Please see order for specifics.    Restraints can be removed when the patient is able to cooperate with plan of care and allow healing to occur, or the medical devices at risk are discontinued by the medical team.     CODE STATUS: FULL CODE    DISPO PLANNING/ SOCIAL: NSU    Nathalie Lagunas MD  Neurology PGY-2        Code Status:  ·  Code Status Full Code     Attestation:   Note Completion:  I am a:   Resident/Fellow   Attending Attestation I saw and evaluated the patient.  I personally obtained the key and critical portions of the history and physical exam or was physically present for key and  critical portions performed by the resident/fellow. I reviewed the resident/fellow?s documentation and discussed the patient with the resident/fellow.  I agree with the resident/fellow?s medical decision making as documented in their note  with the exception/addition of the following:   I personally evaluated the patient on 24-Sep-2023   Comments/ Additional Findings    Ms Simon is a 47 yo female with history of childhood TBI with epilepsy that is sometimes triggered by UTIs.     Neuro - EEG, on keppra (2gBID) and topamax (200BID), on precedex   Respiratory - WTE today   Cardiac - MAP goals >65, hemodynamically stable (off pressors)  ID - start vanc/zosyn for suspected UTI (Urine cx pending); external cath today  Renal - PARI, will start fluids (Cr 1.24)   GI - start TF (held during extubation); will attempt coretrak     PT/OT  SCDs, LVX  Critical Care Patient I have reviewed and evaluated the most recent data and results, personally examined the patient, and formulated the plan of care as presented above.  This patient  was critically ill and required continued critical care treatment. Teaching and any separately billable procedures are not included in the time calculation.   Billing Provider Critical Care Time 35 minute(s)   Primary Critical Care Issue/Treatment (See Assessment and Plan for greater detail) -- This patient has significantly altered mental status (delirium, encephalopathy, coma, or anoxic brain damage). We are treating with appropriate medications,  hemodynamic support, ventilatory and/or oxygenating support, as indicated, as well as doing intensive diagnostic evaluation and neurological monitoring. Please see assessment and plan above for greater detail.         Electronic Signatures:  Janneth  Nathalie CEBALLOS (Resident))  (Signed 24-Sep-2023 11:48)   Authored: Subjective Data, Objective Data, Assessment  and Plan  David Vidal)  (Signed 24-Sep-2023 09:55)   Authored: Note Completion      Last Updated: 24-Sep-2023 11:48 by Nathalie Lagunas (MD (Resident))

## 2023-09-30 NOTE — DISCHARGE SUMMARY
Discharge Diagnosis  Seizures (CMS/[facility])    Epilepsy Quality and Core Measure Topics  The risks and benefits of pertinent medications were discussed with the patient and/or family  Addressed all relevant psychiatric comorbidities  First Time Seizures  No this is not the patient's first seizure  Is this patient seizure free?  No, the following changes will be made to reduce seizure frequency: increased nighttime keppra dose to 2g  Should this patient observe standard seizure precautions?  Yes Reviewed seizure precautions with patient; specifically, the patient may not drive, may not operate heavy machinery, ought not swim unsupervised, should shower rather than bath, should be cautious with hot or heavy objects, and should not perform any activities at heights such as on a ladder. This will be re-assessed at the patient's next appointment.   Medication Side Effects Discussion Statement  The risks and benefits of pertinent medications were discussed with the patient and/or kin.  Women with Epilepsy Discussion  Discussion for a female patient with epilepsy of childbearing age consisted of: patient following with Dr. Vogel    Issues Requiring Follow-Up  Epilepsy    Test Results Pending At Discharge  Pending Labs       Order Current Status    Blood Culture In process    Levetiracetam In process            Hospital Course  Neisha Simon is a 47 yo F with PMH TBI, childhood epilepsy, developmental delay. Patient presented to  for further management of SE. She is known to have right temporal lobe epilepsy and follows with Dr. Vogel. She has had multiple seizures in the past 2/2 UTI.  This time, she had 2 generalized seizures and was found to have UTI and possible urosepsis. Was brought into [facility] where she was intubated and sedated withith propofol, and was loaded with Keppra 4.5g. Patient was admitted to NSU for further w/u of SE. 9/24 no signs of seizures, continue to wean sedation as able. 9/25 Patient is  following commands to squeeze hands as well as lift legs. 9/26 Patient more somnolent, intermittently follows commands. Once patient is extubed, will titrate AED's as needed. 9/27 patient more awake and following commands. Keppra trough (78) was above goal max of 40. This could be contributing to patient's confusion. Decreased keppra AM dose to 1.5 g and keeping PM dose at 2g. Patient extubated and doing well on 9/28. Per sister, patient was 75% back to physical strength baseline and 85% back to mental baseline. Patient transferred from NSU to the floor. Stopped Zosyn and replaced it with Augmentin to finish abx treatment of UTI ending on 9/30. 9/29 Patient was able to stand up and walk. 9/30 Patient discharged home with PCP and epilepsy follow up (Dr. Vogel).    Pertinent Physical Exam At Time of Discharge  Physical Exam  General: Alert and cooperative, in no acute distress, conversational  HEENT: NCAT  Pulm/Resp: non-labored respirations on room air  CV: Heart rate normal  Abdomen: Soft, nondistended.  Extremities: WWP; no peripheral edema    Neurological examination    Mental status: Ms. Simon is awake, alert and appropriate. Communicates with single word sentences.   Cranial nerves: Pupils are equal, round and reactive to light bilaterally.  No visual field deficits noted though patient unable to voice deficits.  Extraocular movements are intact.  Facial sensation appears intact though once again the exam is confounded by the patient's inability to participate.  Facial strength is intact enough to allow patient to smile.  Motor Examination: Bulk is normal throughout.  Axilla and upper and lower extremity tone is normal.  Moves all four extremities spontaneously.  No fasciculations are noted.  There is no tremor or other involuntary movement.  Sensory examination: deferred due to patient not being able to cooperate with exam  Reflexes: deferred  Coordination: deferred  Gait: She walks with walker.     Home  Medications     Medication List      START taking these medications     * levETIRAcetam 750 mg tablet; Commonly known as: Keppra; Take 2 tablets   (1,500 mg) by mouth once daily in the morning.   * levETIRAcetam 1,000 mg tablet; Commonly known as: Keppra; Take 2   tablets (2,000 mg) by mouth once daily at bedtime.   tamsulosin 0.4 mg 24 hr capsule; Commonly known as: Flomax; Take 1   capsule (0.4 mg) by mouth once daily in the morning. Take before meals.   topiramate 200 mg tablet; Commonly known as: Topamax; Take 1 tablet (200   mg) by mouth 2 times a day.  * This list has 2 medication(s) that are the same as other medications   prescribed for you. Read the directions carefully, and ask your doctor or   other care provider to review them with you.       Outpatient Follow-Up  PCP follow up in 1 week.  Epilepsy follow up (Dr. Vogel) in 2 weeks.    Ivan Nuñez MD PhD  PGY-1 Neurology

## 2023-09-30 NOTE — PROGRESS NOTES
Subjective Data:   ID Statement:  MILIND ARTIS is a 48 year old Female who is Hospital Day # 6 and ICU Day #6.     LYNETTE.    Objective Data:     ·  Objective Information      T   P  R  BP   MAP  SpO2   Value  36.3  41  14  145/87   105  99%  Date/Time 9/27 4:00 9/27 6:00 9/27 6:00 9/27 6:00  9/27 6:00 9/27 6:00  Range  (35.5C - 36.4C )  (41 - 78 )  (12 - 25 )  (114 - 169 )/ (78 - 131 )  (95 - 142 )  (93% - 100% )   As of 27-Sep-2023 04:00:00, patient is on 30% oxygen via ventilator assisted.      Pain reported at 9/27 4:00: 0  Pain reported at 9/27 4:00: sleeping      ---- Intake and Output  -----  Mn/Dy/Year Time  Intake   Output  Net  Sep 27, 2023 6:00 am  1085.2   500  585  Sep 26, 2023 10:00 pm  1363.2   214  1149  Sep 26, 2023 2:00 pm  922.9   1403  -481    The Intake and Output Totals for the last 24 hours are:      Intake   Output  Net      3371   2117  1254     Drain and tube details (included in I&O totals)  864 cc      Indwelling Catheter - Urethral( 27-Sep-2023 06:00:00 )     Date:            Weight/Scale Type:  26-Sep-2023 00:00  94.6  kg / bed  25-Sep-2023 00:00  96.1  kg               Vent Settings  9/27 3:30 Modes  AC,  VC,  autoflow  9/27 3:30 Rate Set (breaths/min)  14  9/27 3:30 Tidal Volume Set (mL)  360  9/27 3:30 PEEP (cm H2O)  5  9/27 3:30 FiO2 (%)  30    Vent Data  9/27 3:30 Start Date  22-Sep-2023  9/27 3:30 Start Time  23:24  9/27 3:30 Ventilator Days and Hours  4 Day(s) 4 Hours    Vent Weaning  9/26 10:00 Spontaneous Respiratory Rate (breaths/min)  26 9/26 10:00 Spontaneous Tidal Volume (mL)  300  9/26 10:00 RSBI (f/VT)  86  9/26 10:00 Spontaneous Minute Ventilation (L)  7.8  9/26 10:00 Negative Inspiratory Force (enter negative value) (cm H2O)  -12    Non-Invasive  9/27 3:30 High Inspiratory Pressure (cm H2O)  40    Physical Exam Narrative:  ·  Physical Exam:    General: intubated, on minimal sedation  Chest: CTAB, RRR  GI: obese, non distended, soft, non tender  Neuro:  - Awake,  follows simple commands, on minimal sedation  - Pupils equally reactive b/l 3>2  - Moves AG x4  - w/d to nox stim x4    Allergies:       Allergies:  ·  Dilantin : Unknown    Medications:    Medications:          Continuous Medications       --------------------------------    1. dexmedeTOMIDine 400 microgram/ NaCL 0.9% 100 mL Premix Infusion with Bolus from Ba.2  mcg/kg/hr  IntraVenous  <Continuous>    2. Lactated Ringers Infusion:  1000  mL  IntraVenous  <Continuous>         Scheduled Medications       --------------------------------    1. dexAMETHasone Injectable:  4  mg  IntraVenous Push  Every 6 Hours    2. Docusate 50 mg - Senna 8.6 m  tablet(s)  Feeding tube  2 Times a Day    3. Enoxaparin SubCutaneous:  40  mg  SubCutaneous  Every 24 Hours    4. Influenza Virus QUADRIVALENT (Inactive) ADULT Vaccine:  0.5  mL  IntraMuscular  Once    5. Insulin Lispro Mild Corrective Scale:  unit(s)  SubCutaneous  Every 4 Hours    6. levETIRAcetam (KEPPRA):  2000  mg  Feeding tube  2 Times a Day    7. Piperacillin - Tazobactam 3.375 gram/Iso-osmotic 50 mL Premix IVPB:  50  mL  IntraVenous Piggyback  Every 6 Hours    8. Topiramate:  200  mg  Feeding tube  2 Times a Day         PRN Medications       --------------------------------    1. Calcium Gluconate 1 gram/ NaCL 0.67% 50 mL Premix IVPB:  50  mL  IntraVenous Piggyback  Every 6 Hours    2. Calcium Gluconate 2 gram/ NaCL 0.67% 100 mL Premix IVPB:  100  mL  IntraVenous Piggyback  Every 6 Hours    3. Dextrose 50% in Water Injectable:  25  gram(s)  IntraVenous Push  Every 15 Minutes    4. fentaNYL Injectable:  25  microgram(s)  IntraVenous Push  Every 1 Hour    5. Glucagon Injectable:  1  mg  IntraMuscular  Every 15 Minutes    6. Magnesium Sulfate 2 gram/Sterile Water 50 mL Premix Soln:  2  gram(s)  IntraVenous Piggyback  Every 6 Hours    7. Magnesium Sulfate 4 gram/Sterile Water 100 mL Premix Soln:  4  gram(s)  IntraVenous Piggyback  Every 6 Hours    8. Polyethylene  Glycol:  17  gram(s)  Oral  Daily    9. Potassium Chloride 20 mEq/Sterile Water 100 mL Premix IVPB:  20  mEq  IntraVenous Piggyback  Every 6 Hours    10. Potassium Chloride Powder Packet:  20  mEq  Feeding tube  Every 6 Hours    11. Potassium Chloride Powder Packet:  40  mEq  Feeding tube  Every 6 Hours        Recent Lab Results:    Results:    CBC: 9/27/2023 04:07              \     Hgb     /                              \     12.0       /  WBC  ----------------  Plt               9.0       ----------------    135 L            /     Hct     \                              /     36.6       \            RBC: 4.04     MCV: 91           RFP: 9/27/2023 04:07  NA+        Cl-     BUN  /                         142    113 H   21  /  --------------------------------  Glucose                ---------------------------  130 H    K+     HCO3-   Creat \                         3.7    19 L   1.12 H  \  Calcium : 7.6 LAnion Gap : 14          Albumin : 2.7 L     Phos : 2.6          Assessment and Plan:   Daily Risk Screen:  ·  Does patient have a central line? yes   ·  Central Line Type non-tunneled   ·  Plan for non-tunneled central line removal today? yes   ·  Does patient have an indwelling urinary catheter? no   ·  Is the patient intubated? yes   ·  Plan for extubation today? yes     Assessment/Plan:  ·  Assessment/Plan:    Neisha Simon is a 49yo F with PMH TBI, childhood epilepsy. Patient presented to  for further management of SE. She had 2 generalized seizures today, was given  Versed 5mg intranasally without aborting seizure. Was brought into [facility] where she was intubated and sedated with propofol, and was loaded with Keppra 4.5g. Per family, she often has breakthrough seizures in the setting of UTI. UA shows UTI, treated with  vanc/zosyn, later stopped vanc and plan for total 5-7 days.  Patient admitted to NSU for further w/u of SE. Initially on propofol requiring pressors, later switched to precedex. Her  course was complicated by difficulty extubation 2ry to low tidal volumes  and no cuff leak, she was s/p dex x2 days and was successfuly extubated 9/27. cvEEG showed Left temporal epileptogenicity, encephalopathy, no Sz and was d/c 9/26. ID consulted for consideration of chronic suppressive ABx u/s/o/ of recurrent UT with breakthorugh  seizures, but recommended none for now given patient did not show systemic signs of UTI (her Initial leukocytosis was though to be more related to her seizures).     Updates 9/27:  - Extubated this AM w/o complications, Precedex Off  - TRACEY in AM  - Will remove Femoral line once pt has good 2 PIV  - Bedside swallow 4h after extubation  - ID recommend no suppressive Abx  - Trough Keppra last night 78, will repeat this AM  - Zosyn stop date 9/30    NEUROLOGICAL  #c/f status epilepticus   #TBI, childhood epilepsy, Intellectual disability  -EEG 9/24: Left temporal epileptogenicity, encephalopathy, no Sz  - EEG 9/25 Left temporal epileptogenicity, encephalopathy, no Sz + left frontocentral epileptogenicity  Plan:  - Q1h neuro checks  - Seizures: Of EEG, daily AED levels   - continue keppra 2g twice a day   - cont home topiramate 200mg BID  - Pain and Sedation: acetaminophen  - PT/OT, Swallow deferred       CARDIAC  Assessment:  - Cardiac Rhythm: SR on tele  Plan:  - Tele monitoring  - SBP<180, PRN labetalol/hydralazine, Cardene as needed  - MAPs >65     RESPIRATORY   #Respiratory insufficiency in the setting of seizures requiring intubation   Assessment:  Plan:  - Extubated 9/27, on 2L NC  - Weaning O2        GI:  Assessment:  - Last BM: 9/25  - increased OGT output - 250cc until noon 9/25, then improved.  - KUB still showing bowel distension.  Plan:  - Bowel regimen: Colace/senna, miralax as needed     F/E/N:  #Hypokalemia, hypernatremia improved  Assessment:  Plan:  - Continue LR 75cc/hr  - Replace electrolytes per ICU protocol  - nutrition:  Pivot 1.5 @ 10ml/hr and increase by 10ml/hr  every 6-8 hours as tolerated to a goal rate of 30ml/hr.   - Switch to PO diet once swallowing    RENAL:  #PARI  #Low bicarb, 10 on admission >17>15  Assessment:  - basaeline ~1.1  - Per pt sister, he has chronic urinary retention(mostly functional), needs fq reminder to empty bladder.  Plan:  - Damon catheter d/c 9/25  - Pt still requires intermittent cath, required x9 times.  - Will start Flomax 0.4mg for female LUTS    ENDO:  Assessment:  - BS: wnl  Plan:  - Accucheck with mild corrective SSI q4h  - Hypoglycemia protocol    HEMATOLOGY:  #Thrombocytopenia  -Plt Trend 125 >170-130-95>122  - no clcear baseline, but on last admission on April had thrombocytopenia  - c/w to monitor for now    INFECTIOUS DISEASE  #UTI  - WBC trend : 22> 17 > wnl  - UA: 112 WBC, +nitrite, mod LE (no Ucx order seen)  - Afebrile  - blood cx NGTD   - MRSA screen negative  Plan:  - (9/23 - 9/30) zosyn   - (9/23 - 9/26 ) vanc  - ID consult for consideration of ppx Abx, recommend no ppx at this time given low concerns of systemic UTI  - Urine culture pending      Skin/Musculoskeletal:  Assessment:  - No acute issues   Plan:  - Skin per ICU protocol     LINES:  - peripheral IV  - Right femoral central ine, plan to remove after extubation.    PROPHYLAXIS:  - DVT: SCD?s, Lovenox   - GI: not indicated     RESTRAINTS:   -I agree with nursing assessment of the patient?s need for restraints to protect the patient from injury and facilitate healing. The patient is unable to cooperate with the plan of care and at risk for disrupting critical therapy (i.e., removing  medical devices, lines, tubes and/or dressings).  Please see order for specifics.    Restraints can be removed when the patient is able to cooperate with plan of care and allow healing to occur, or the medical devices at risk are discontinued by the medical team.     CODE STATUS: FULL CODE    DISPO PLANNING/ SOCIAL: NSU          Code Status:  ·  Code Status Full Code     Comorbidities:  ·   Comorbidity altered mental status   ·  Altered Mental Status Type encephalopathy   ·  Encephalopathy Type unspecified     Attestation:   Note Completion:  I am a:  Resident/Fellow   Attending Attestation I saw and evaluated the patient.  I personally obtained the key and critical portions of the history and physical exam or was physically present for key and  critical portions performed by the resident/fellow. I reviewed the resident/fellow?s documentation and discussed the patient with the resident/fellow.  I agree with the resident/fellow?s medical decision making as documented in their note  with the exception/addition of the following:   I personally evaluated the patient on 27-Sep-2023   Comments/ Additional Findings    exam 9/27 __:  eyes open spontaneously intermittently  following commands; 5/5 BUE/BLE    h/o childhood TBI and epilepsy admitted with GTCs; endotracheally intubated on arrival for airway protection  increased home keppra to 2 g BID; continued home topamax; h/o breakthrough seizure with UTI or in past with Covid  AEDs oral; if fails swallow, will get corpak __    zosyn for UTI started 9/23; will end 9/30  start flomax for recurrent high residual bladder volumes    lines: remove femoral after extubation and stable __    dispo: step down to floor in AM 9/28  Critical Care Patient I have reviewed and evaluated the most recent data and results, personally examined the patient, and formulated the plan of care as presented above.  This patient  was critically ill and required continued critical care treatment. Teaching and any separately billable procedures are not included in the time calculation.   Billing Provider Critical Care Time 30 minute(s)   Primary Critical Care Issue/Treatment (See Assessment and Plan for greater detail) -- For the nature of the critical condition and treatment, this documentation has been prepared by the attending physician/JACKSON- billing provider of these critical  care  services.         Electronic Signatures:  Melquiades Corona (Resident))  (Signed 27-Sep-2023 12:43)   Authored: Subjective Data, Objective Data, Assessment  and Plan, Note Completion  Vicente Hadley)  (Signed 27-Sep-2023 09:21)   Authored: Note Completion      Last Updated: 27-Sep-2023 12:43 by Melquiades Corona (MD (Resident))

## 2023-09-30 NOTE — PROGRESS NOTES
Service: Epilepsy     Subjective Data:   MILIND ARTIS is a 48 year old Female who is Hospital Day # 8.     NAEO. Patient was able to stand and take a few steps with walker.    Overnight Events: Patient had an uneventful night.     Objective Data:     Objective Information:      T   P  R  BP   MAP  SpO2   Value  36.3  79  30  166/94   112  99%  Date/Time 9/29 8:00 9/29 12:00 9/29 12:00 9/29 12:00  9/29 12:00 9/29 12:00  Range  (36.3C - 36.6C )  (73 - 100 )  (17 - 36 )  (117 - 170 )/ (66 - 139 )  (82 - 148 )  (93% - 100% )      Pain reported at 9/29 12:00: 0  Pain reported at 9/29 4:00: CPOT    ---- Intake and Output  -----  Mn/Dy/Year Time  Intake   Output  Net  Sep 29, 2023 6:00 am  0   800  -800  Sep 28, 2023 10:00 pm  0   600  -600  Sep 28, 2023 2:00 pm  0   900  -900    The Intake and Output Totals for the last 24 hours are:      Intake   Output  Net      null   2300  null    Physical Exam Narrative:  ·  Physical Exam:    Neuro  moving all four extremities  speaks in simple sentences  eating and drinking  up and walking with walker today        Recent Lab Results:    Results:    CBC: 9/29/2023 04:47              \     Hgb     /                              \     11.8 L    /  WBC  ----------------  Plt               6.3       ----------------    147 L            /     Hct     \                              /     36.2       \            RBC: 4.08     MCV: 89           RFP: 9/29/2023 04:47  NA+        Cl-     BUN  /                         142    114 H   11  /  --------------------------------  Glucose                ---------------------------  99    K+     HCO3-   Creat \                         3.5    20 L   0.96  \  Calcium : 8.1 LAnion Gap : 12          Albumin : 3.0 L     Phos : 2.3 L      Assessment and Plan:   Daily Risk Screen:  ·  Does patient have an indwelling urinary catheter? n/a consulting service   ·  Does patient have a central line? yes   ·  Central Line Type PICC   ·  Plan for  PICC removal today? no   ·  The patient continues to require a PICC for access     Comorbidities:  ·  Comorbidity Other     Code Status:  ·  Code Status Full Code     Assessment:    Neisha Simon is a 47 yo F with PMH TBI, childhood epilepsy, developmental delay. Patient presented to  for further management of SE. She is known to have right  temporal lobe epilepsy and follows with Dr. Vogel. She has had multiple seizures in the past 2/2 UTI.  This time, she had 2 generalized seizures and was found to have UTI and possible urosepsis. Was brought into [facility] where she was intubated and sedated  withith propofol, and was loaded with Keppra 4.5g. Patient was admitted to NSU for further w/u of SE. 9/24 no signs of seizures, continue to wean sedation as able. 9/25 Patient is following commands to squeeze hands as well as lift legs. 9/26 Patient  more somnolent, intermittently follows commands. Once patient is extubed, will titrate AED's as needed. 9/27 patient more awake and following commands. Keppra trough (78) was above goal max of 40. This could be contributing to patient's confusion. Decreased  keppra AM dose to 1.5 g and keeping PM dose at 2g. Patient extubated and doing well on 9/28. Per sister, patient was 75% back to physical strength baseline and 85% back to mental baseline. Patient transferred from NSU to the floor. Keppra levels remained  toxic. Will work on titrating AEDs to appropriate levels. Also stopped Zosyn and replaced it with Augmentin to finish abx treatment of UTI ending on 9/30. 9/29 Patient was able to stand up and walk. Sister plans to transport patient home tomorrow.    Changes Today:  - spoke with patient's sister, she will bring helpers to transport patient home tomorrow  - spoke to PT/OT, they are unable to assess patient today  - with assistance of RN, stood the patient up and had her walk a few steps with walker today    #Right temporal lobe epilepsy  #Status epilepticus, resolved  -  Keppra 1500 mg AM and 2000 mg PM  - Continue with TOP 200mg BID   - vEEG    #PARI  #Low bicarb, 10 on admission >17>15  Assessment:  - basaeline ~1.1  - Per pt sister, he has chronic urinary retention(mostly functional), needs fq reminder to empty bladder.  Plan:  - Damon catheter d/c 9/25  - Pt still requires intermittent cath, required x9 times.  - Will start Flomax 0.4mg for female LUTS    #UTI  - WBC trend : 22> 17 > wnl  - UA: 112 WBC, +nitrite, mod LE (no Ucx order seen)  - Afebrile  - blood cx NGTD   - MRSA screen negative  - UCx NGTD  Plan:  - (9/23 - 9/28) Zosyn  - (9/23 - 9/26 ) vanc  - Augmentin 500/125mg TID 9/28-9/30  - ID consult for consideration of ppx Abx, recommend no ppx at this time given low concerns of systemic UTI    CODE STATUS: FULL CODE    Ivan Nuñez MD PhD  Neurology PGY-1l Epilepsy Team  Available on [facility]     Attestation:   Note Completion:  I am a:  Resident/Fellow   Attending Attestation I saw and evaluated the patient.  I personally obtained the key and critical portions of the history and physical exam or was physically present for key and  critical portions performed by the resident/fellow. I reviewed the resident/fellow?s documentation and discussed the patient with the resident/fellow.  I agree with the resident/fellow?s medical decision making as documented in the note.     I personally evaluated the patient on 29-Sep-2023   Comments/ Additional Findings    I saw and examined the patient, and agree with the note above. Minor edits made to reflect additional information. I spent 35 minutes were spent  reviewing this patient's chart, medications, vitals, labs, diagnostic testing, and performing the physical exam, 50% of this time was spent on providing counseling and coordination of care.          Electronic Signatures:  Adilia Moore)  (Signed 29-Sep-2023 17:27)   Authored: Note Completion   Co-Signer: Service, Subjective Data, Objective Data, Assessment and Plan,  Note Completion  Ivan Nuñez (Resident))  (Signed 29-Sep-2023 14:01)   Authored: Service, Subjective Data, Objective Data, Assessment  and Plan, Note Completion      Last Updated: 29-Sep-2023 17:27 by Adilia Moore)

## 2023-09-30 NOTE — PROGRESS NOTES
Service: Epilepsy     Subjective Data:   MILIND ARTIS is a 48 year old Female who is Hospital Day # 5.     NAEO. Nursing reports that patient has agitation at night which they have been giving fentanyl for. Patient remains intubated and will possibly extubate today.    Objective Data:     Objective Information:      T   P  R  BP   MAP  SpO2   Value  35.8  45  23  167/100   121  95%  Date/Time 9/26 8:00 9/26 8:00 9/26 8:00 9/26 8:00  9/26 8:00 9/26 8:00  Range  (35C - 36.2C )  (43 - 91 )  (12 - 23 )  (102 - 170 )/ (77 - 111 )  (87 - 129 )  (92% - 100% )   As of 26-Sep-2023 04:00:00, patient is on 30% oxygen via ventilator assisted.      Pain reported at 9/26 8:00: 0  Pain reported at 9/26 8:00: cpot    ---- Intake and Output  -----  Mn/Dy/Year Time  Intake   Output  Net  Sep 26, 2023 6:00 am  898   800  98  Sep 25, 2023 10:00 pm  767.2   1211  -444  Sep 25, 2023 2:00 pm  891.8   0  891    The Intake and Output Totals for the last 24 hours are:      Intake   Output  Net      2757   2011  713    Physical Exam Narrative:  ·  Physical Exam:    General: intubated on dex  Neuro  follows commands intermittently, quite somnolent  pupils pinpoint  thumbs up on both sides  wiggles toes on both sides to command  can move whole right leg spontaneously  no abnormal movements   moving eyes spontaneously  gag and cough reflex present  when assessing corneal reflex, patient blinks before water drop hits her eyes      Recent Lab Results:    Results:    CBC: 9/25/2023 23:57              \     Hgb     /                              \     12.6       /  WBC  ----------------  Plt               7.0       ----------------    122 L            /     Hct     \                              /     41.0       \            RBC: 4.31     MCV: 95           RFP: 9/25/2023 23:57  NA+        Cl-     BUN  /                         140    114 H   11  /  --------------------------------  Glucose                ---------------------------   127 H    K+     HCO3-   Creat \                         3.9    15 L   0.97  \  Calcium : 8.0 LAnion Gap : 15          Albumin : 2.9 L     Phos : 3.6      Assessment and Plan:   Daily Risk Screen:  ·  Does patient have an indwelling urinary catheter? n/a consulting service   ·  Does patient have a central line? n/a consulting service     Comorbidities:  ·  Comorbidity Other     Code Status:  ·  Code Status Full Code     Assessment:    Neisha Simon is a 46yo F with PMH TBI, childhood epilepsy. Patient presented to  for further management of SE. She is known to have right temporal lobe epilepsy  and follows with Dr. Vogel. She has had multiple seizures in the past 2/2 UTI.  This time, she had 2 generalized seizures and was found to have UTI and possible urosepsis. Was brought into [facility] where she was intubated and sedated with propofol, and  was loaded with Keppra 4.5g. Patient was admitted to NSU for further w/u of SE. 9/24 no signs of seizures, continue to wean sedation as able. 9/25 Patient is following commands to squeeze hands as well as lift legs. 9/26 Patient more somnolent, intermittently  follows commands. Once patient is extubed, will titrate AED's as needed.    Changes Today:  - transition AEDs to PO as able  - continue Keppra 2g  - continue Topomax 200 mg BID  - continue vEEG  - please obtain AM trough level of Keppra and Topomax    #Right temporal lobe epilepsy  #Status epilepticus, resolved  - continue Keppra 2g BID.  - Continue with TOP 200mg BID   - AM Keppra and Topomax trough levels  - Please keep patient on vEEG    Rest of care per NSU team     Ivan Nuñez MD PhD  Neurology PGY-1l Epilepsy Team  Available on [employer]     Attestation:   Note Completion:  I am a:  Resident/Fellow   Attending Attestation I saw and evaluated the patient.  I personally obtained the key and critical portions of the history and physical exam or was physically present for key and  critical portions performed by the  resident/fellow. I reviewed the resident/fellow?s documentation and discussed the patient with the resident/fellow.  I agree with the resident/fellow?s medical decision making as documented in the note.     I personally evaluated the patient on 26-Sep-2023   Comments/ Additional Findings    I saw and examined the patient, and agree with the note above. Minor edits made to reflect additional information. I spent 35 minutes were spent  reviewing this patient's chart, medications, vitals, labs, diagnostic testing, and performing the physical exam, 50% of this time was spent on providing counseling and coordination of care.          Electronic Signatures:  Adilia Moore)  (Signed 26-Sep-2023 22:06)   Authored: Note Completion   Co-Signer: Service, Subjective Data, Objective Data, Assessment and Plan, Note Completion  Ivan Nuñez (Resident))  (Signed 26-Sep-2023 11:45)   Authored: Service, Subjective Data, Objective Data, Assessment  and Plan, Note Completion      Last Updated: 26-Sep-2023 22:06 by Adilia Moore)

## 2023-09-30 NOTE — DISCHARGE INSTRUCTIONS
Dear Ms. Neisha,    You were seen at  by our epilepsy service because you experienced seizures after having a urinary tract infection. We decreased your anti-seizure medications and also treated you for your infection with antibiotics. At the time of your discharge, you are no longer are having seizures and your infection has been fully treated. Your new anti-seizure medication regimen will be Keppra 1.5 grams AM, Keppra 2 grams PM, and Topiramate 200 mg twice daily. We are also providing you with 0.4 mg daily Flomax which will help you with your difficulty with urination. If you are no longer having problems with this, you can stop it.     We will have you follow up with your primary care provider in the next two weeks. Please also follow up with your epileptologist, Dr. Vogel in two weeks time. If no one calls to schedule, please call her office and make an appointment.    Sincerely,  Your epilepsy team at

## 2023-09-30 NOTE — H&P
"    History of Present Illness:   Pregnant/Lactating:  ·  Are You Pregnant no   ·  Are You Currently Breastfeeding no     Service:  Service: Epilepsy     History Present Illness:  HPI:        HPI  Pt is a 47 years old Left handed female with past medical history significant for TBI, childhood epilepsy who is  presenting to  for further management of status epilepticus.  Per chart review patient had a seizure at home at 11 AM that lasted less than 5 minutes and no abortive medication was given.  Family  went to work and when they came back they recognize that she was postictal.  She then had back to back seizures lasting greater than 5 minutes each and EMS was called. EMS gave 5 mg intranasal Versed without aborting seizure activity.  At [facility] she was  intubated and started on propofol.  She became hypotensive with propofol so was started on Levophed as well.  She was loaded with 4.5 g of Keppra at approximately 7 PM.    I talked with patient's sister who is her primary caregiver.  Sister is out of town but she reported that over the last week patient's urine has been smelling off.  Yesterday patient seemed more tired and quiet.  This morning around 11 patient had a \"grand  mal\" seizure witnessed by a cousin.  She then had a second seizure during the day.  Patient sister says that she typically has seizures once a month.  Triggers for her seizures are infections such as UTI which she commonly gets.  She has not missed any  of her seizure medications and she is taking Keppra 1500 mg twice a day and Topamax 200 mg twice a day.  Patient follows with Dr. Vogel and was last seen in June 2023 and was continued on Keppra and Topamax.    Patient was hospitalized at [facility] in April 2023 for seizures.  Per chart review this was in the setting of sepsis from UTI.  She required intubation and was sedated with propofol which was weaned.    Labs on presentation to the ED  UA with positive nitrite and moderate leuk esterase  CT " head with stable volume loss in left parietal and occipital lobes  Chest x-ray showed mild left perihilar edema versus infiltrate      Classification of the Paroxysmal Episodes: Epileptic  Episodes semiology:  1.Oral automotor seizures with dialepsis  2.Versive (unclear which side) -> Bilateral asymmetric tonic sz -> Generalized clonicFrequency: 1 per month  Epileptogenic zone: Right parietal  Co-morbidities: developmental delay, TBI  History of Status Epilepticus: Yes  Previous AEDs:  Phenobarbital  Current AEDs:  Keppra 1500 mg oral BID  topiramate 200 mg BID    Review of systems unable to be obtained due to patient's mental status    Epilepsy risk factors:  Birth History: Full term   complications: Unknown  History of febrile convulsions: Yes  History of CNS infections: No  Developmental milestones: Delayed  History of head trauma with loss of consciousness: Yes  Family history of seizures: No  History of CNS surgery: Yes, ?craniectomy      Seizure work up:  Patient had most of her w/u and follow ups in [facility]  vEEG 8/3: This vEEG is indicative of a right parietal epileptogenicity. In addition, it is indicative of moderate diffuse encephalopathy.  No seizures were seen. vEEG 1/10: This vEEG is indicative of a right parietal epileptogenicity and a left hemispheric dysfunction. In  addition, it is indicative of severe diffuse encephalopathy. No seizures were seen.    MRI   Diffusion weighted images show no evidence of acute ischemic infarct.    Mild periventricular and subcortical hemispheric T2/FLAIR white  matter hyperintensities are most compatible with chronic small vessel  ischemic disease. The mesial temporal lobes are symmetric in size and  signal intensity.    There is no evidence of an acute intraparenchymal hemorrhage, mass,  mass-effect, midline shift or extra-axial fluid collection.    Unchanged degree of lateral ventricular dilatation, with the left  lateral ventricle slightly more  dilated than the right.    IMPRESSION:  No new acute intracranial abnormality. Unchanged bilateral lateral  ventricular dilatation (left > right).    PMH/PSH: Per HPI  - MRDD  - TBI   - Hydrocephalus  - Epilepsy    Medications  Keppra 1500 mg twice a day  Topiramate 200 mg twice a day  Calcium, B12 supplement    Social History:  Home situation: Living with her sister, walks with some assistance from sister.  Verbal at baseline    Education: No school  Tobacco: No  Alcohol: No  Illegal drugs: No  Allergies: Dilantin (unknown reaction)               Allergies:  ·  Dilantin : Unknown    Medications Prior to Admission:   Admission Medication Reconciliation has not been completed for this patient.      Physical Exam Narrative:  ·  Physical Exam:    GENERAL APPEARANCE: Intubated, no acute distress    CARDIOVASCULAR: Regular, rate and rhythm, no murmurs, normal S1 and S2.       MENTAL STATE:   Patient is intubated.  Sedation held for 5 minutes.  She does not follow commands.  Language testing limited due to mental status, not following commands. General fund of knowledge was impaired.    OPHTHALMOSCOPIC:   Unable to assess due to limited pt cooperation     CRANIAL NERVES:   CN 2   No clear visual field deficit noted  CN 3, 4, 6   Pupils round, 1 mm in diameter, equally reactive to light. Lids symmetric; no ptosis. No gaze preference. No nystagmus.   CN 5   Unable to assess due to limited pt cooperation  CN 7   No facial asymmetry   CN 8   Unable to assess due to limited pt cooperation  N 9   Unable to assess due to limited pt cooperation  CN 11   Unable to assess due to limited pt cooperation  CN 12   Unable to assess due to limited pt cooperation    MOTOR:   No spontaneous movement  Patient withdraws to noxious stimuli in upper and lower extremities symmetrically  Unable to perform formal strength testing due to mental status and limited pt cooperation.    REFLEXES:   Reflexes decreased diffusely throughout    Babinski:  toes mute to plantar stimulation. No clonus or other pathologic reflexes present.     SENSORY:   Withdrew to noxious stimuli throughout.   Unable to perform full sensory examination due to mental status and limited pt cooperation.    COORDINATION:    Unable to assess due to mental status and limited pt cooperation    GAIT:   Unable to assess due to mental status and limited pt cooperation      Recent Lab Results:    Results:    CBC: 9/22/2023 18:37              \     Hgb     /                              \     14.0       /  WBC  ----------------  Plt               2.8 L    ----------------    125 L            /     Hct     \                              /     44.4       \            RBC: 4.71     MCV: 94           RFP: 9/22/2023 18:37  NA+        Cl-     BUN  /                         146 H   120 H    20  /  --------------------------------  Glucose                ---------------------------  131 H    K+     HCO3-   Creat \                         2.7 LL   10 LL    1.21 H \  Calcium : 6.8 LAnion Gap : 19          Albumin : 3.2 L     Phos : 2.7        I have reviewed these laboratory results:    Urinalysis  22-Sep-2023 19:27:00      Result Value    Color, Urine  YELLOW    Reference Range: STRAW,YELLOW    Appearance, Urine  HAZY    Specific Gravity, Urine  1.017    pH, Urine  5.0    Protein, Urine  100(2+)   A   Glucose, Urine  NEGATIVE    Blood, Urine  SMALL(1+)   A   Ketones, Urine  NEGATIVE    Bilirubin, Urine  NEGATIVE    Urobilinogen, Urine  <2.0    Nitrite, Urine  POSITIVE   A   Leukocyte Esterase, Urine  MODERATE(2+)   A     Urinalysis, Microscopic  22-Sep-2023 19:27:00      Result Value    White Cells  112   A   WBC Clumps  RARE    Red Blood Cells  3    Epithelial Cells, Squamous  1    Bacteria, Urine  4+   A   Mucous  1+      Renal Function Panel  22-Sep-2023 18:37:00      Result Value    Lab Comment:  GIACOMO HUTCHISON CALLED RB TO ROLANDO MORALES., 09/22/2023 19:47    Glucose, Serum  131   H   NA   146   H   K  2.7   LL   CL  120   H   Bicarbonate, Serum  10   LL   Anion Gap, Serum  19    BUN  20    CREAT  1.21   H   GFR Female  55   A   Calcium, Serum  6.8   L   Phosphorus, Serum  2.7    ALB  3.2   L     Hepatic Function Panel  22-Sep-2023 18:37:00      Result Value    Aspartate Transaminase, Serum  24    ALB  3.2   L   T Bili  0.5    Bilirubin, Serum Direct - Conjugated  0.1    ALKP  83    Alanine Aminotransferase, Serum  29    T Pro  5.2   L     Complete Blood Count + Differential  22-Sep-2023 18:37:00      Result Value    White Blood Cell Count  2.8   L   Red Blood Cell Count  4.71    HGB  14.0    HCT  44.4    MCV  94    MCHC  31.5   L   PLT  125   L   RDW-CV  12.7    Immature Granulocytes %  1.1   H   Differential Comment  SEE MANUAL DIFF      RBC Morphology  22-Sep-2023 18:37:00      Result Value    Red Blood Cell Morphology  See Below    Giant Platelets  Few      Manual Differential Panel  22-Sep-2023 18:37:00      Result Value    % Seg Neutrophil  67.0    % Band Neutrophil  5.0    % Lymphocyte  14.0    % Monocyte  0.0    % Eosinophil  0.0    % Basophil  0.0    % Lymph-Atypical  14.0    Absolute Neutrophil Count (ANC)  2.02    Seg Neutrophil Count  1.88    Band Neutrophil Count  0.14    Lymphocyte, Count  0.39   L   Monocyte, Count  0.00   L   Eosinophil, Count  0.00    Basophil, Count  0.00    Lymph Atypical, Count  0.39      Magnesium, Serum  22-Sep-2023 18:37:00      Result Value    Magnesium, Serum  1.40   L       Radiology Results:    Results:        Impression:    1. No acute intracranial abnormality.     2. Stable mild volume loss in the left parietal and occipital lobes.        MACRO:  None.     CT Head without Contrast [Sep 22 2023  8:41PM]      Impression:       1. Endotracheal tube in satisfactory location.  2. NG tube placement tip overlying left upper quadrant gastric air  bubble.  3. Low inspiratory film with right perihilar consolidation and mild  left perihilar edema versus  infiltrates..        Signed by Primo Manuel MD     Xray Chest 1 View [Sep 22 2023  8:05PM]      Assessment and Plan:   Assessment:    Assessment  47 years old Left handed female with past medical history significant for TBI, childhood epilepsy presenting to  for further management  of status epilepticus.  She had 2 generalized convulsive seizures today, was subsequently brought to [facility] where she was intubated and sedated with propofol.  Per family she often has breakthrough  seizures in the setting of UTI.  Her UA does show signs of infection.  Suspect breakthrough seizures in the setting of urinary tract infection      Plan:    NEURO:  #RightPosteriro temporal lobe epilepsy  #Status epilepticus   - Received intranasal Versed and 4.5 g Keppra load at [facility]   - start cVEEG  - Check Keppra and topiramate levels  - CT head stable   - ABG with metabolic acidosis and elevated lactate to 5.8  - Continue propofol, wean propofol in the morning as long as EEG stable without ongoing seizures  - Status post 4.5 g Keppra load 9/22  - Start home Keppra 1500 mg twice daily and Topamax 200 twice daily  - Q1H neuro checks  - Sedation: Propofol  - PT/OT  - SLP    CARDIOVASCULAR:  #Hypotension -iatrogenic from propofol  -Continue Levophed to maintain MAP greater than 65 on propofol  -Wean Levophed as tolerated  -Maintain MAP greater than 65  - Continue to monitor on telemetry    RESPIRATORY  #Respiratory failure requiring intubation due to inability to protect airway  - Continuous pulse oximetry   - O2 PRN to maintain SpO2 > 94%, wean as tolerated  - Incentive spirometry Q2H while awake  - Ventilator bundle with Chlorhexidine Q4H while intubated    RENAL/  #Hypokalemia, hypomagnesemia  #Metabolic acidosis  - replete K and Mag  - Monitor with daily RFP  - Maintain andre catheter  - Monitor and replace electrolytes per protocol      GI/FEN  - Last BM: PTA  - Diet: N.p.o.  - Bowel Regimen: Docusate-Senna    ENDOCRINE  -  Accuchecks & ISS Q6H    HEMATOLOGY  #Thrombocytopenia  - Continue to monitor with daily CBC and Coag panel as needed    INFECTIOUS DISEASE  #Urinary tract infection  #Concern for pneumonia  -Start Vanco/Zosyn  -MRSA PCR pending  -Follow-up urine culture  - Continue to monitor for s/sx of infection  - Pan culture for temperature > 38.4 C    MUSCULOSKELETAL  - No acute issues    SKIN  - No acute issues    ACCESS  - Line type (Date)      F: none   E: Replete per NSU protocol  N: NPO   Ppx: none      Code status : Full code    Contact : Patient's sister who is HAMIDA garcia        Attestation:   Note Completion:  I am a:  Resident/Fellow   Attending Attestation I saw and evaluated the patient.  I personally obtained the key and critical portions of the history and physical exam or was physically present for key and  critical portions performed by the resident/fellow. I reviewed the resident/fellow?s documentation and discussed the patient with the resident/fellow.  I agree with the resident/fellow?s medical decision making as documented in the note.   I personally evaluated the patient on 22-Sep-2023   Comments/ Additional Findings    I saw and examined the patient, and agree with the note above. Minor edits made to reflect additional information. I spent 75 minutes were spent  reviewing this patient's chart, medications, vitals, labs, diagnostic testing, and performing the physical exam, 50% of this time was spent on providing counseling and coordination of care.          Electronic Signatures:  Adilia Moore)  (Signed 26-Sep-2023 21:49)   Authored: Note Completion   Co-Signer: Assessment and Plan, Note Completion  Roger Gibbons (Resident))  (Signed 23-Sep-2023 07:40)   Authored: History of Present Illness, Comorbidities,  Allergies, Medications Prior to Admission, Objective, Assessment and Plan, Note Completion  Mayda King)  (Signed 24-Sep-2023  15:58)   Authored: Assessment and Plan, Note Completion   Co-Signer: History of Present Illness, Comorbidities, Allergies, Medications Prior to Admission, Objective, Assessment and Plan, Note Completion      Last Updated: 26-Sep-2023 21:49 by Adilia Moore)

## 2023-09-30 NOTE — PROGRESS NOTES
Service: Epilepsy     Subjective Data:   MILIND ARTIS is a 48 year old Female who is Hospital Day # 3.     NAEO. On prop and intubated. Off of pressors as of 5:15 am.    Objective Data:     Objective Information:      T   P  R  BP   MAP  SpO2   Value  36  73  10  98/72   81  100%  Date/Time 9/24 8:00 9/24 8:00 9/24 8:00 9/24 8:00  9/24 8:00 9/24 8:00  Range  (35.6C - 36.7C )  (62 - 96 )  (6 - 28 )  (73 - 168 )/ (55 - 130 )  (63 - 136 )  (95% - 100% )   As of 23-Sep-2023 20:00:00, patient is on 30% oxygen via ventilator assisted.      Pain reported at 9/24 4:00: 0  Pain reported at 9/24 4:00: CPOT    ---- Intake and Output  -----  Mn/Dy/Year Time  Intake   Output  Net  Sep 24, 2023 6:00 am  1069.8   550  519  Sep 23, 2023 10:00 pm  1393.4   550  843  Sep 23, 2023 2:00 pm  703.6   490  213    The Intake and Output Totals for the last 24 hours are:      Intake   Output  Net      3166   1590  1576    Physical Exam Narrative:  ·  Physical Exam:    General: intubated, sedated  Neuro  shakes head when attempts are made to open eyelids  pupils 2-->1   squeezes hands  WDX4   no abnormal movements     Recent Lab Results:    Results:    CBC: 9/24/2023 04:16              \     Hgb     /                              \     11.3 L    /  WBC  ----------------  Plt               9.8       ----------------    114 L            /     Hct     \                              /     35.9 L    \            RBC: 3.90 L    MCV: 92           RFP: 9/24/2023 04:16  NA+        Cl-     BUN  /                         142    114 H   14  /  --------------------------------  Glucose                ---------------------------  99    K+     HCO3-   Creat \                         3.5    19 L   1.24 H  \  Calcium : 7.7 LAnion Gap : 13          Albumin : 2.8 L     Phos : 1.9 L      Coagulation: 9/24/2023 04:16  PT  /                    15.2 H /  -------<    INR          ----------<      1.3 H  PTT\                    34  \                        ---------- Recent Arterial Blood Gas Results----------     9/23/2023 12:18  pO2 145  pH 7.44  pCO2 27  SO2 100  Base Excess -4.5null    Assessment and Plan:   Daily Risk Screen:  ·  Does patient have an indwelling urinary catheter? n/a consulting service   ·  Does patient have a central line? n/a consulting service     Comorbidities:  ·  Comorbidity Other     Code Status:  ·  Code Status Full Code     Assessment:    Neisha Simon is a 46yo F with PMH TBI, childhood epilepsy. Patient presented to  for further management of SE. She had 2 generalized seizures today, was given  Versed 5mg intranasally without aborting seizure. Was brought into [facility] where she was intubated and sedated with propofol, and was loaded with Keppra 4.5g. Per family, she often has breakthrough seizures in the setting of UTI. UA shows UTI, will treat  with vanc/zosyn.  Patient admitted to NSU for further w/u of SE. Currently intubated, sedated. 9/24 no signs of seizures, continue to wean sedation as able.    Changes Today:  - continue Keppra 2g  - continue Topomax 200 mg BID  - continue vEEG  - please obtain AM trough level of Keppra and Topomax (ordered for you)    #Right temporal lobe epilepsy  #Status epilepticus, resolved  - continue Keppra 2g BID.  - Continue with TOP 200mg BID   - AM Keppra and Topomax level  - Please keep patient on vEEG    Rest of care per NSU team     Ivan Nuñez MD PhD  Neurology PGY-1l Epilepsy Team  Available on [employer]     Attestation:   Note Completion:  I am a:  Resident/Fellow   Attending Attestation I saw and evaluated the patient.  I personally obtained the key and critical portions of the history and physical exam or was physically present for key and  critical portions performed by the resident/fellow. I reviewed the resident/fellow?s documentation and discussed the patient with the resident/fellow.  I agree with the resident/fellow?s medical decision making as documented in the note.     I  personally evaluated the patient on 24-Sep-2023   Comments/ Additional Findings    I saw and examined the patient, and agree with the note above. Minor edits made to reflect additional information. I spent 35 minutes reviewing this  patient's chart, medications, vitals, labs, diagnostic testing, and performing the physical exam, 50% of this time was spent on providing counseling and coordination of care.     Mayda Vogel MD  Ep[facility], [facility]          Electronic Signatures:  Mayda King)  (Signed 24-Sep-2023 16:12)   Authored: Assessment and Plan, Note Completion   Co-Signer: Service, Subjective Data, Objective Data, Assessment and Plan, Note Completion  Ivan Nuñez (Resident))  (Signed 24-Sep-2023 11:25)   Authored: Service, Subjective Data, Objective Data, Assessment  and Plan, Note Completion      Last Updated: 24-Sep-2023 16:12 by Mayda King)

## 2023-10-01 ENCOUNTER — PHARMACY VISIT (OUTPATIENT)
Dept: PHARMACY | Facility: CLINIC | Age: 48
End: 2023-10-01

## 2023-10-01 LAB — LEVETIRACETAM SERPL-MCNC: 51 UG/ML (ref 10–40)

## 2023-10-04 ENCOUNTER — TELEPHONE (OUTPATIENT)
Dept: NEUROLOGY | Facility: HOSPITAL | Age: 48
End: 2023-10-04
Payer: MEDICARE

## 2023-10-12 NOTE — CONSULTS
"    Service:   Service: Infectious Disease     Consult:  Consult requested by (Attending Name): David Vidal   Reason: Status epilepticus with UTI, management and  consideration for prophylyactic abx     History of Present Illness:   HPI:    MILIND ARTIS is a 48 year old Female with a PMH including TBI, epilepsy, mild developmental delay, recurrent breakthrough seizures presented with seizures.     Patient was in her USOH until 11 AM on 9/22 when she had a seizure (<5 mins) and was found to be post ictal when her family came back home. Patient continued to have seizures after that and family eventually called 911.   She was sent to [facility] where she was inyubated and started on Propofol, following which she became hypotensive and was started on Levophed. As per chart review patients sister mentioned that her urine was \"foul smelling\" and has been more tired and quiet  lately. According to the family her seizure triggers are UTI which she gets commonly.     On presentation UA was positive for nitrites and leukocyte esterase, and 112 WBCs. CT head was stable with volume loss in left parietal and occipital lobes. CXR showed mild left perihilar edema vs infiltrate. Fpr concerns of UTI patient was started on  Vanc and piptazo and was admitted in NSU.       MICRO:  MRSA screen: 9/23: Negative  Blood culutre: 9/23: One set: NGTD    Antibiotics:  Piptazo: 9/22 - p  Vancomycin: 9/23 - p    Review Family/Social History and ROS:   Social History:    Smoking Status: unable to assess  (1)            Allergies:  ·  Dilantin : Unknown    Objective:     Objective Information:        T   P  R  BP   MAP  SpO2   Value  36.1  71  17  113/92   99  96%  Date/Time 9/25 16:00 9/25 18:00 9/25 18:00 9/25 18:00  9/25 18:00 9/25 18:00  Range  (35C - 37.2C )  (52 - 92 )  (10 - 28 )  (78 - 151 )/ (55 - 109 )  (64 - 122 )  (92% - 100% )   As of 25-Sep-2023 16:00:00, patient is on 30% oxygen via ventilator assisted.  Highest temp of 37.2 C was " recorded at  20:00    Physical Exam Narrative:  ·  Physical Exam:    GENERAL: Patient is laying in  bed, intubated, getting EEG  HEENT: No lesions around mouth or inside the mouth. Anicteric sclera.  RESP: Intubated with an FiO2 of 30%. CTAB  CVS: RRR, No M/G/R  GI: Soft, non tender, +BS  MSK: No visible deformity.  NEURO: Sedated with Precedex   SKIN: No visible rash      Medications:    Medications:      ANTI-INFECTIVES:    1. Piperacillin - Tazobactam 3.375 gram/Iso-osmotic 50 mL Premix IVPB:  50  mL  IntraVenous Piggyback  Every 6 Hours      CENTRAL NERVOUS SYSTEM AGENTS:    1. fentaNYL Injectable:  25  microgram(s)  IntraVenous Push  Every 1 Hour   PRN       2. levETIRAcetam (KEPPRA) IV Piggy Back:  2000  mg  IntraVenous Piggyback  Every 12 Hours    3. Topiramate:  200  mg  Feeding tube  2 Times a Day    4. dexmedeTOMIDine 400 microgram/ NaCL 0.9% 100 mL Premix Infusion with Bolus from Ba.2  mcg/kg/hr  IntraVenous  <Continuous>      COAGULATION MODIFIERS:    1. Enoxaparin SubCutaneous:  40  mg  SubCutaneous  Every 24 Hours      GASTROINTESTINAL AGENTS:    1. Docusate 50 mg - Senna 8.6 m  tablet(s)  Feeding tube  2 Times a Day    2. Polyethylene Glycol:  17  gram(s)  Oral  Daily   PRN         HORMONES/HORMONE MODIFIERS:    1. dexAMETHasone Injectable:  4  mg  IntraVenous Push  Every 6 Hours      IMMUNOLOGIC AGENTS:    1. Influenza Virus QUADRIVALENT (Inactive) ADULT Vaccine:  0.5  mL  IntraMuscular  Once      METABOLIC AGENTS:    1. Insulin Lispro Mild Corrective Scale:  unit(s)  SubCutaneous  Every 4 Hours    2. Dextrose 50% in Water Injectable:  25  gram(s)  IntraVenous Push  Every 15 Minutes   PRN       3. Glucagon Injectable:  1  mg  IntraMuscular  Every 15 Minutes   PRN         NUTRITIONAL PRODUCTS:    1. Calcium Gluconate 1 gram/ NaCL 0.67% 50 mL Premix IVPB:  50  mL  IntraVenous Piggyback  Every 6 Hours   PRN       2. Calcium Gluconate 2 gram/ NaCL 0.67% 100 mL Premix IVPB:  100  mL   IntraVenous Piggyback  Every 6 Hours   PRN       3. Magnesium Sulfate 2 gram/Sterile Water 50 mL Premix Soln:  2  gram(s)  IntraVenous Piggyback  Every 6 Hours   PRN       4. Magnesium Sulfate 4 gram/Sterile Water 100 mL Premix Soln:  4  gram(s)  IntraVenous Piggyback  Every 6 Hours   PRN       5. Potassium Chloride 20 mEq/Sterile Water 100 mL Premix IVPB:  20  mEq  IntraVenous Piggyback  Every 6 Hours   PRN       6. Potassium Chloride Powder Packet:  20  mEq  Feeding tube  Every 6 Hours   PRN       7. Potassium Chloride Powder Packet:  40  mEq  Feeding tube  Every 6 Hours   PRN       8. Sodium Chloride 0.9% Infusion:  1000  mL  IntraVenous  <Continuous>      Recent Lab Results:    Results:        I have reviewed these laboratory results:    Urinalysis with Culture if Indicated  25-Sep-2023 15:47:00      Result Value    Color, Urine  YELLOW  Reference Range: STRAW,YELLOW    Appearance, Urine  CLEAR    Specific Gravity, Urine  1.014    pH, Urine  6.0    Protein, Urine  NEGATIVE    Glucose, Urine  NEGATIVE    Blood, Urine  SMALL (1+)   A   Ketones, Urine  5 (TRACE)   A   Bilirubin, Urine  NEGATIVE    Urobilinogen, Urine  <2.0    Nitrite, Urine  NEGATIVE    Leukocyte Esterase, Urine  TRACE   A     Urinalysis, Microscopic  25-Sep-2023 15:47:00      Result Value    White Cells  6   A   Red Blood Cells  1    Epithelial Cells, Squamous  <1    Epithelial Cells, Transitional  <1      RBC Morphology  25-Sep-2023 04:40:00      Result Value    Red Blood Cell Morphology  See Below    Niranjan Cell  Marked    Basophilic Stippling  Present          Assessment:    MILIND ARTIS is a 48 year old Female with a PMH including TBI, epilepsy, mild developmental delay, recurrent breakthrough seizures presented with seizures and  for concerns of UTI.  Patients UA was positive for nitrites and leukocyte esterase, and had 112 WBCs. We were consulted for abx management and for possibility of chronic suppressive therapy.     Given the fact  that patient never had a fever, and the initial leukocytosis was most likely secondary to seizures will treat her with a short course of antibiotics.      MICRO:  MRSA screen: 9/23: Negative  Blood culutre: 9/23: One set: NGTD    Antibiotics:  Piptazo: 9/22 - p  Vancomycin: 9/23 - p    Recommendations:  -Treat her with IV piptazo 3.375 grams q6hrs for a total of 5-7 days.   - Will not treat her with chronic suppressive therapy, as it will only increase her risk of being colonized with resistant microorganisms.    We will sign off. Please feel free to reach us for any questions.    Discussed with Dr Lindsay Nice  PGY4, ID Fellow.  Team A Pager: [phone]  Doc Halo preferred.        Consult Status:  Consult Status    (select all that apply): initial  consult complete, will follow   Consult Order ID: [id number]     Attestation:   Note Completion:  I am a:  Resident/Fellow   Attending Attestation I saw and evaluated the patient.  I personally obtained the key and critical portions of the history and physical exam or was physically present for key and  critical portions performed by the resident/fellow. I reviewed the resident/fellow?s documentation and discussed the patient with the resident/fellow.  I agree with the resident/fellow?s medical decision making as documented in the note.     I personally evaluated the patient on 25-Sep-2023   Comments/ Additional Findings    48-year-old female with history of epilepsy and developmental delay presented to hospital for breakthrough seizures.  ID consulted for antibiotic  recommendations regarding recurrent UTI as the cause of breakthrough seizures.    Patient was admitted in April 2023 for seizures and at that time patient had COVID and febrile.  Patient was also treated for UTI.  During this admission patient is afebrile and no active signs or symptoms of sepsis like bacteremia.  I am not totally  convinced that patient has history of recurrent UTI based on review of  "cultures and also patient cannot provide any history in relation to UTI.  In view of this I do not recommend chronic suppressive therapy at this time.  I would recommend follow-up with family physician and if patient is known to have recurrent UTI then suppressive therapy can be considered.          Electronic Signatures:  Raymond Montoya (MD)  (Signed 26-Sep-2023 15:42)   Authored: Assessment/Recommendations, Note Completion   Co-Signer: Service, History of Present Illness, Review Family/Social History and ROS, Allergies, Objective, Assessment/Recommendations,  Note Completion  Cortney Nice (Fellow))  (Signed 26-Sep-2023 11:55)   Authored: Service, History of Present Illness, Review  Family/Social History and ROS, Allergies, Objective, Assessment/Recommendations, Note Completion      Last Updated: 26-Sep-2023 15:42 by Raymond Montoya (MD)    References:  1.  Data Referenced From \"Patient Profile - Adult v2\" 23-Sep-2023 03:33   "

## 2023-10-16 NOTE — DOCUMENTATION CLARIFICATION NOTE
"    PATIENT:               MILIND ARTIS  ACCT #:                  [id number]  MRN:                       [id number]  :                       1975  ADMIT DATE:       2023 11:22 PM  DISCH DATE:        2023 4:49 PM  RESPONDING PROVIDER #:        [id number]          PROVIDER RESPONSE TEXT:    Multifactorial Encephalopathy, Metabolic encephalopathy 2/2 UTI  and encephalopathy 2/2 seizures    CDI QUERY TEXT:    UH_Encephalopathy Type      Instruction:    Based on your assessment of the patient and the clinical information, please provide the requested documentation by clicking on the appropriate radio button and enter any additional information if prompted.    Question: Please further clarify the type of Encephalopathy as    When answering this query, please exercise your independent professional judgment. The fact that a question is being asked, does not imply that any particular answer is desired or expected.    The patient's clinical indicators include:  Clinical Information:   46yo F with PMH TBI, childhood epilepsy. Patient presented to  for further management of SE. She is known to have right temporal lobe epilepsy    Clinical Indicators:  DPN  Critical care by Dr. Corona and Dr. Hadley  \" She has had multiple seizures in the past 2/2 UTI.  This time, she had 2 generalized seizures and was found to have UTI and possible urosepsis.\" \"Patient was admitted to NSU for further w/u of SE.  no signs of seizures, continue to wean sedation as able. \"    \"- Awake, follows simple commands, on minimal sedation\" \"-EEG : Left temporal epileptogenicity, encephalopathy, no Sz\"  \"#UTI - WBC trend : 22> 17 > wnl - UA: 112 WBC, +nitrite, mod LE (no Ucx order seen)  - Afebrile - blood cx NGTD  - ( - ) zosyn  - ( -  ) vanc\"  \"Urine culture pending\"    Comorbidities : \"Altered Mental status type encephalopathy\"  \"Encephalopathy type : unspecified\"  \"zosyn for UTI started ; will end " "9/30\"    Treatment: Daily labs, EEG, Q 1 hour neuro checks, Vancomycin 750mg IVPB Q 12,   Zosyn 3.375 gram  IVPB q 6 hours    Risk Factors: UTI, Seizures  Options provided:  -- Metabolic Encephalopathy 2/2 UTI  -- Encephalopathy 2/2 Seizures  -- Multifactorial Encephalopathy, Metabolic encephalopathy 2/2 UTI  and encephalopathy 2/2 seizures  -- Other - I will add my own diagnosis  -- Refer to Clinical Documentation Reviewer    Query created by: Tomeka Delgado 10/16/2023 9:26 AM      Electronically signed by:  YASMINE BROUSSARD MD 10/16/2023 10:31 AM          "

## 2023-10-18 NOTE — DOCUMENTATION CLARIFICATION NOTE
"    PATIENT:               MILIND ARTIS  ACCT #:                  [id number]  MRN:                       [id number]  :                       1975  ADMIT DATE:       2023 11:22 PM  DISCH DATE:        2023 4:49 PM  RESPONDING PROVIDER #:        [id number]          PROVIDER RESPONSE TEXT:    Initially intubated for airway protection and progressed to acute respiratory failure as evidenced by prolonged    mechanical ventilation or failure to wean    CDI QUERY TEXT:    UH_Conflicting Documentation    Instruction:    Based on your assessment of the patient and the clinical information, please provide the requested documentation by clicking on the appropriate radio button and enter any additional information if prompted.    Question: Based on your medical judgment, can you please clarify which of these conditions is the most clinically supported      When answering this query, please exercise your independent professional judgment. The fact that a question is being asked, does not imply that any particular answer is desired or expected.    The patient's clinical indicators include:  Clinical Information: There is conflicting documentation in the medical record which requires clarification.    -The diagnosis of \"Respiratory failure\" was documented on  H&P by Dr. Gibbons and Dr. King    -The diagnosis of \"Airway protection\" documented on  H&P by Dr. Gibbons and Dr. King      Clinical Indicators:   ABG on   7.44/27/145     H&P by Dr. Gibbons and Dr. King \"47 years old Left handed female with past medical history significant for TBI, childhood epilepsy presenting to  for further management of status epilepticus.\"  \"#Respiratory failure requiring intubation due to inability to protect airway\"  \"- ABG with metabolic acidosis and elevated lactate to 5.8\"        by Dr. Lagunas and Dr. Vidal \"#Respiratory insufficiency in the setting of seizures requiring " "intubation \"    Treatment: ABG,  Mechanical Ventilation FIO2 30%      Risk Factors: Seizure  Options provided:  -- Initially intubated for airway protection and progressed to acute respiratory failure as evidenced by prolonged  mechanical ventilation or failure to wean  -- Initially intubated for airway protection and progressed to acute respiratory failure as evidenced by other, please specify [Type additional information here:  ]  -- Intubated for airway protection with no progression to respiratory failure  -- Other - I will add my own diagnosis  -- Refer to Clinical Documentation Reviewer    Query created by: Tomeka Delgado on 10/16/2023 9:25 AM      Electronically signed by:  ATIF COLON MD 10/18/2023 3:57 PM          "

## 2023-11-11 RX ORDER — PRIMIDONE 250 MG/1
250 TABLET ORAL EVERY 12 HOURS
COMMUNITY

## 2023-11-11 RX ORDER — DOCUSATE SODIUM 100 MG/1
1 CAPSULE, LIQUID FILLED ORAL 2 TIMES DAILY PRN
COMMUNITY
Start: 2022-12-21

## 2023-11-11 RX ORDER — OLIVE OIL
5 OIL (ML) MISCELLANEOUS 3 TIMES WEEKLY
COMMUNITY
Start: 2022-12-21

## 2023-11-11 RX ORDER — DIAZEPAM 10 MG/2G
10 GEL RECTAL AS NEEDED
COMMUNITY
Start: 2023-01-20

## 2023-11-11 RX ORDER — MIDAZOLAM 5 MG/.1ML
0.1 SPRAY NASAL AS NEEDED
COMMUNITY

## 2023-11-11 RX ORDER — ERGOCALCIFEROL 1.25 MG/1
CAPSULE ORAL
COMMUNITY
Start: 2021-09-13

## 2023-11-11 RX ORDER — LEVETIRACETAM 250 MG/1
6 TABLET ORAL 2 TIMES DAILY
COMMUNITY
Start: 2023-09-13 | End: 2023-12-11

## 2023-11-11 RX ORDER — CICLOPIROX 80 MG/ML
SOLUTION TOPICAL NIGHTLY
COMMUNITY
Start: 2022-12-21

## 2023-11-11 RX ORDER — LANOLIN ALCOHOL/MO/W.PET/CERES
1000 CREAM (GRAM) TOPICAL DAILY
COMMUNITY

## 2023-11-13 ENCOUNTER — APPOINTMENT (OUTPATIENT)
Dept: NEUROLOGY | Facility: HOSPITAL | Age: 48
End: 2023-11-13
Payer: MEDICARE

## 2023-12-11 DIAGNOSIS — R56.9 SEIZURE (MULTI): ICD-10-CM

## 2023-12-11 DIAGNOSIS — G40.109 LOCALIZATION-RELATED (FOCAL) (PARTIAL) SYMPTOMATIC EPILEPSY AND EPILEPTIC SYNDROMES WITH SIMPLE PARTIAL SEIZURES, NOT INTRACTABLE, WITHOUT STATUS EPILEPTICUS (MULTI): ICD-10-CM

## 2023-12-11 RX ORDER — LEVETIRACETAM 250 MG/1
750 TABLET ORAL 2 TIMES DAILY
Qty: 360 TABLET | Refills: 0 | Status: SHIPPED | OUTPATIENT
Start: 2023-12-11 | End: 2023-12-18 | Stop reason: SDUPTHER

## 2023-12-18 ENCOUNTER — APPOINTMENT (OUTPATIENT)
Dept: NEUROLOGY | Facility: HOSPITAL | Age: 48
End: 2023-12-18
Payer: MEDICARE

## 2023-12-18 RX ORDER — LEVETIRACETAM 1000 MG/1
2000 TABLET ORAL NIGHTLY
Qty: 60 TABLET | Refills: 2 | Status: SHIPPED | OUTPATIENT
Start: 2023-12-18 | End: 2024-06-10 | Stop reason: DRUGHIGH

## 2023-12-18 RX ORDER — LEVETIRACETAM 750 MG/1
1500 TABLET ORAL DAILY
Qty: 120 TABLET | Refills: 0 | Status: SHIPPED | OUTPATIENT
Start: 2023-12-18 | End: 2024-06-10 | Stop reason: DRUGHIGH

## 2023-12-18 RX ORDER — TOPIRAMATE 200 MG/1
200 TABLET ORAL 2 TIMES DAILY
Qty: 60 TABLET | Refills: 2 | Status: SHIPPED | OUTPATIENT
Start: 2023-12-18 | End: 2024-06-10 | Stop reason: SDUPTHER

## 2024-06-10 ENCOUNTER — OFFICE VISIT (OUTPATIENT)
Dept: NEUROLOGY | Facility: HOSPITAL | Age: 49
End: 2024-06-10
Payer: MEDICARE

## 2024-06-10 VITALS
HEIGHT: 60 IN | SYSTOLIC BLOOD PRESSURE: 156 MMHG | DIASTOLIC BLOOD PRESSURE: 93 MMHG | WEIGHT: 182 LBS | BODY MASS INDEX: 35.73 KG/M2 | HEART RATE: 61 BPM

## 2024-06-10 DIAGNOSIS — G40.109 LOCALIZATION-RELATED EPILEPSY (MULTI): Primary | ICD-10-CM

## 2024-06-10 DIAGNOSIS — R56.9 SEIZURE (MULTI): ICD-10-CM

## 2024-06-10 PROCEDURE — 99212 OFFICE O/P EST SF 10 MIN: CPT | Performed by: STUDENT IN AN ORGANIZED HEALTH CARE EDUCATION/TRAINING PROGRAM

## 2024-06-10 RX ORDER — LEVETIRACETAM 1000 MG/1
2000 TABLET ORAL 2 TIMES DAILY
Qty: 360 TABLET | Refills: 3 | Status: SHIPPED | OUTPATIENT
Start: 2024-06-10 | End: 2025-06-10

## 2024-06-10 RX ORDER — TOPIRAMATE 200 MG/1
200 TABLET ORAL 2 TIMES DAILY
Qty: 60 TABLET | Refills: 2 | Status: SHIPPED | OUTPATIENT
Start: 2024-06-10 | End: 2024-09-08

## 2024-06-10 NOTE — PATIENT INSTRUCTIONS
You have been seen today in Epilepsy clinic for your seizures. As we discussed:    - Please increase Levetiracetam to 2000 mg twice daily.     - Continue Topiramate 200 mg twice daily  - If you have any questions, please call my office. If you have any sudden new concerning or worsening symptoms, call 911 and go to the Emergency Room. Otherwise, it was good seeing you today, and we will see you back in 6 months

## 2024-06-10 NOTE — PROGRESS NOTES
48 years old Left handed female with past medical history significant for TBI, childhood epilepsy presenting to the clinic for epilepsy follow up       Classification of the Paroxysmal Episodes: Epileptic  Episodes semiology:  1.Oral automotor seizures with dialepsis  2.Versive (unclear which side) -> Bilateral asymmetric tonic sz -> Generalized clonic  Frequency: 1 per month  Epileptogenic zone: Right parietal   Co-morbidities: developmental delay, TBI     History of Status Epilepticus: Yes     Previous AEDs:  Phenobarbital   Current AEDs:   Keppra 1500 mg oral BID   topiramate 200 mg BID     Interim :   Last seen in 2023.  Was admitted to the hospital in 2023 for status epilepticus in the setting of UTI. Keppra was increased to  1500 mg am and 2000 mg pm, topiramate continued at 200 mg bid.   He is doing well. Approx 4 seizures since 2023.  No side effects from Keppra.        Epilepsy risk factors:  Birth History: Full term   complications: Unknown  History of febrile convulsions: Yes  History of CNS infections: No  Developmental milestones: Delayed  History of head trauma with loss of consciousness: Yes  Family history of seizures: No  History of CNS surgery: Yes, ?craniectomy        Seizure work up:   Patient had most of her w/u and follow ups in [facility]  vEEG 8/3: This vEEG is indicative of a right parietal epileptogenicity. In addition, it is indicative of moderate diffuse encephalopathy. No seizures were seen. vEEG 1/10: This vEEG is indicative of a right parietal epileptogenicity and a left hemispheric dysfunction. In addition, it is indicative of severe diffuse encephalopathy. No seizures were seen.       PMH/PSH: Per HPI     Social History:  Home situation: Living with her sister  Education: No school  Tobacco: No  Alcohol: No  Illegal drugs: No     Allergies: Dilantin (unknown reaction)      14 point ROS otherwise negative.    Visit Vitals  BP (!) 156/93   Pulse 61   General  appearance: Abnormal Hand contractures   Orientation: disoriented to person, disoriented to place and disoriented to time.   Memory: recent memory impaired and remote memory impaired.   Attention: the attention span was decreased and a decrease in concentrating ability was observed.   Language: difficulty with repetition, but normal comprehension and no difficulty naming common objects. for expression was not recorded   Fund of knowledge: Patient displays inadequate knowledge of current events.   Eyes: The opthalmoscopic exam was not testable. Secondary to an inability for the patient to cooperate, the fundi and posterior segments could not be visualized   Cranial nerve II: Visual fields full to confrontation.   Cranial nerves III, IV, and VI: Pupils round, equally reactive to light; no ptosis. EOMs intact. No nystagmus.   Cranial Nerve V: Facial sensation intact bilaterally.   Motor: Motor exam was normal. Muscle bulk was normal in both upper and lower extremities.   Deep Tendon Reflexes: left biceps 2+ , right biceps 2+, left brachioradialis 2+, right brachioradialis 2+, left patella 2+, right patella 2+   Sensory Exam: Normal to light touch.   Coordination: coordination was abnormal. Inability to cooperate.    Assessment and plan  Ms. Simon is a 48 years old Left handed female with past medical history significant for TBI, childhood epilepsy, multiples breakthrough seizure/status in the setting of UTI. Last GTC in 9/23. Since then aprox 4 left versive seizures.  She is curently on Levetiracetam 1500-2000mg  and topiramate 200 mg bid.      Classification of the Paroxysmal Episodes: Epileptic  Episodes semiology:  1.Oral automotor seizures with dialepsis  2.Versive (unclear which side) -> Bilateral asymmetric tonic sz -> Generalized clonic  Frequency: Every 2 months  Epileptogenic zone: Right parietal   Co-morbidities: developmental delay, TBI    Plan:   Will increase Keppra to 2000 mg bid  Continue Topiramate  200 mg bid  UA  Will follow up in 6 months

## 2024-07-12 DIAGNOSIS — R56.9 SEIZURE (MULTI): ICD-10-CM

## 2024-07-12 RX ORDER — TOPIRAMATE 200 MG/1
200 TABLET ORAL 2 TIMES DAILY
Qty: 180 TABLET | Refills: 2 | Status: SHIPPED | OUTPATIENT
Start: 2024-07-12

## 2024-07-20 DIAGNOSIS — R56.9 SEIZURE (MULTI): ICD-10-CM

## 2024-07-22 RX ORDER — LEVETIRACETAM 1000 MG/1
2000 TABLET ORAL 2 TIMES DAILY
Qty: 360 TABLET | Refills: 3 | Status: SHIPPED | OUTPATIENT
Start: 2024-07-22 | End: 2025-07-22

## 2024-11-04 ENCOUNTER — HOSPITAL ENCOUNTER (EMERGENCY)
Facility: HOSPITAL | Age: 49
Discharge: HOME | End: 2024-11-05
Attending: EMERGENCY MEDICINE
Payer: MEDICARE

## 2024-11-04 DIAGNOSIS — G40.919 BREAKTHROUGH SEIZURE (MULTI): ICD-10-CM

## 2024-11-04 DIAGNOSIS — T16.2XXA FOREIGN BODY OF LEFT EAR, INITIAL ENCOUNTER: Primary | ICD-10-CM

## 2024-11-04 DIAGNOSIS — N39.0 URINARY TRACT INFECTION WITHOUT HEMATURIA, SITE UNSPECIFIED: ICD-10-CM

## 2024-11-04 LAB
ALBUMIN SERPL BCP-MCNC: 3.9 G/DL (ref 3.4–5)
ALP SERPL-CCNC: 82 U/L (ref 33–110)
ALT SERPL W P-5'-P-CCNC: 17 U/L (ref 7–45)
ANION GAP SERPL CALC-SCNC: 8 MMOL/L (ref 10–20)
AST SERPL W P-5'-P-CCNC: 24 U/L (ref 9–39)
BASOPHILS # BLD AUTO: 0 X10*3/UL (ref 0–0.1)
BASOPHILS NFR BLD AUTO: 0 %
BILIRUB SERPL-MCNC: 0.3 MG/DL (ref 0–1.2)
BUN SERPL-MCNC: 19 MG/DL (ref 6–23)
CALCIUM SERPL-MCNC: 8.3 MG/DL (ref 8.6–10.3)
CHLORIDE SERPL-SCNC: 116 MMOL/L (ref 98–107)
CO2 SERPL-SCNC: 21 MMOL/L (ref 21–32)
CREAT SERPL-MCNC: 1.15 MG/DL (ref 0.5–1.05)
EGFRCR SERPLBLD CKD-EPI 2021: 58 ML/MIN/1.73M*2
EOSINOPHIL # BLD AUTO: 0 X10*3/UL (ref 0–0.7)
EOSINOPHIL NFR BLD AUTO: 0 %
ERYTHROCYTE [DISTWIDTH] IN BLOOD BY AUTOMATED COUNT: 12.9 % (ref 11.5–14.5)
GLUCOSE SERPL-MCNC: 82 MG/DL (ref 74–99)
HCT VFR BLD AUTO: 43.4 % (ref 36–46)
HGB BLD-MCNC: 13.9 G/DL (ref 12–16)
IMM GRANULOCYTES # BLD AUTO: 0.01 X10*3/UL (ref 0–0.7)
IMM GRANULOCYTES NFR BLD AUTO: 0.2 % (ref 0–0.9)
LYMPHOCYTES # BLD AUTO: 1.86 X10*3/UL (ref 1.2–4.8)
LYMPHOCYTES NFR BLD AUTO: 43.5 %
MAGNESIUM SERPL-MCNC: 2.03 MG/DL (ref 1.6–2.4)
MCH RBC QN AUTO: 29.8 PG (ref 26–34)
MCHC RBC AUTO-ENTMCNC: 32 G/DL (ref 32–36)
MCV RBC AUTO: 93 FL (ref 80–100)
MONOCYTES # BLD AUTO: 0.42 X10*3/UL (ref 0.1–1)
MONOCYTES NFR BLD AUTO: 9.8 %
NEUTROPHILS # BLD AUTO: 1.99 X10*3/UL (ref 1.2–7.7)
NEUTROPHILS NFR BLD AUTO: 46.5 %
NRBC BLD-RTO: 0 /100 WBCS (ref 0–0)
PLATELET # BLD AUTO: 184 X10*3/UL (ref 150–450)
POTASSIUM SERPL-SCNC: 5 MMOL/L (ref 3.5–5.3)
PROT SERPL-MCNC: 6.6 G/DL (ref 6.4–8.2)
RBC # BLD AUTO: 4.67 X10*6/UL (ref 4–5.2)
SODIUM SERPL-SCNC: 140 MMOL/L (ref 136–145)
WBC # BLD AUTO: 4.3 X10*3/UL (ref 4.4–11.3)

## 2024-11-04 PROCEDURE — 83735 ASSAY OF MAGNESIUM: CPT | Performed by: EMERGENCY MEDICINE

## 2024-11-04 PROCEDURE — 99283 EMERGENCY DEPT VISIT LOW MDM: CPT

## 2024-11-04 PROCEDURE — 85025 COMPLETE CBC W/AUTO DIFF WBC: CPT | Performed by: EMERGENCY MEDICINE

## 2024-11-04 PROCEDURE — 80053 COMPREHEN METABOLIC PANEL: CPT | Performed by: EMERGENCY MEDICINE

## 2024-11-04 PROCEDURE — 69200 CLEAR OUTER EAR CANAL: CPT | Mod: LT

## 2024-11-04 ASSESSMENT — COLUMBIA-SUICIDE SEVERITY RATING SCALE - C-SSRS
2. HAVE YOU ACTUALLY HAD ANY THOUGHTS OF KILLING YOURSELF?: NO
1. IN THE PAST MONTH, HAVE YOU WISHED YOU WERE DEAD OR WISHED YOU COULD GO TO SLEEP AND NOT WAKE UP?: NO
6. HAVE YOU EVER DONE ANYTHING, STARTED TO DO ANYTHING, OR PREPARED TO DO ANYTHING TO END YOUR LIFE?: NO

## 2024-11-04 ASSESSMENT — PAIN SCALES - WONG BAKER: WONGBAKER_NUMERICALRESPONSE: NO HURT

## 2024-11-04 ASSESSMENT — PAIN - FUNCTIONAL ASSESSMENT: PAIN_FUNCTIONAL_ASSESSMENT: WONG-BAKER FACES

## 2024-11-05 VITALS
OXYGEN SATURATION: 100 % | SYSTOLIC BLOOD PRESSURE: 135 MMHG | HEIGHT: 60 IN | TEMPERATURE: 97.5 F | WEIGHT: 182 LBS | BODY MASS INDEX: 35.73 KG/M2 | DIASTOLIC BLOOD PRESSURE: 99 MMHG | RESPIRATION RATE: 16 BRPM | HEART RATE: 65 BPM

## 2024-11-05 LAB
APPEARANCE UR: ABNORMAL
BILIRUB UR STRIP.AUTO-MCNC: NEGATIVE MG/DL
COLOR UR: ABNORMAL
GLUCOSE UR STRIP.AUTO-MCNC: NORMAL MG/DL
KETONES UR STRIP.AUTO-MCNC: NEGATIVE MG/DL
LEUKOCYTE ESTERASE UR QL STRIP.AUTO: ABNORMAL
MUCOUS THREADS #/AREA URNS AUTO: ABNORMAL /LPF
NITRITE UR QL STRIP.AUTO: ABNORMAL
PH UR STRIP.AUTO: 7 [PH]
PROT UR STRIP.AUTO-MCNC: ABNORMAL MG/DL
RBC # UR STRIP.AUTO: NEGATIVE /UL
RBC #/AREA URNS AUTO: ABNORMAL /HPF
SP GR UR STRIP.AUTO: 1.02
SQUAMOUS #/AREA URNS AUTO: ABNORMAL /HPF
UROBILINOGEN UR STRIP.AUTO-MCNC: NORMAL MG/DL
WBC #/AREA URNS AUTO: >50 /HPF

## 2024-11-05 PROCEDURE — 2500000002 HC RX 250 W HCPCS SELF ADMINISTERED DRUGS (ALT 637 FOR MEDICARE OP, ALT 636 FOR OP/ED): Performed by: EMERGENCY MEDICINE

## 2024-11-05 PROCEDURE — 81001 URINALYSIS AUTO W/SCOPE: CPT | Performed by: EMERGENCY MEDICINE

## 2024-11-05 PROCEDURE — 87186 SC STD MICRODIL/AGAR DIL: CPT | Mod: AHULAB | Performed by: EMERGENCY MEDICINE

## 2024-11-05 RX ORDER — SULFAMETHOXAZOLE AND TRIMETHOPRIM 800; 160 MG/1; MG/1
1 TABLET ORAL ONCE
Status: COMPLETED | OUTPATIENT
Start: 2024-11-05 | End: 2024-11-05

## 2024-11-05 RX ORDER — SULFAMETHOXAZOLE AND TRIMETHOPRIM 400; 80 MG/1; MG/1
1 TABLET ORAL 2 TIMES DAILY
Qty: 14 TABLET | Refills: 0 | Status: SHIPPED | OUTPATIENT
Start: 2024-11-05 | End: 2024-11-12

## 2024-11-05 NOTE — ED TRIAGE NOTES
Patient has had two seizures today. Patient has a history of having seizures when she has UTI, seizures can also be pain triggered as well. Patients sister states that her urine has been darker than normal with a slight stronger odor. Sister is also concerned for a possible earring being lost into her L ear.

## 2024-11-05 NOTE — ED PROVIDER NOTES
HPI   Chief Complaint   Patient presents with    Seizures    Possible UTI       This is a 49-year-old female who presents to the emergency department with her sister who provides history for multiple complaints.  The patient had 2 seizures today.  She has a history of seizures and both the seizures today were short-lived.  The patient's sister, however, reports that she has not had seizures for a couple of months.  She is also more likely to have seizures when she has an urinary infection.  Her urine seem to be dark lately.  Also, she has an earring in her left ear canal that the sister can see but she did not try to remove.    Past medical history: Developmental delay, seizures, urinary tract infections              Patient History   Past Medical History:   Diagnosis Date    Closed fracture of fourth metatarsal bone of right foot 09/21/2016    Closed nondisplaced fracture of second metatarsal bone of right foot 09/21/2016    Closed nondisplaced fracture of third metatarsal bone of right foot 09/21/2016    Dental caries 05/13/2013    Formatting of this note might be different from the original. Added automatically from request for surgery [id number]    Dermatophytosis of nail 01/24/2008    Drug-induced pancytopenia (CMS-[facility]) 11/05/2010    Formatting of this note might be different from the original. likely    Gingivitis 05/13/2013    Implanon in place 11/01/2013    Onychomycosis 11/02/2015    Pain in limb 01/24/2008    Vitamin B12 deficiency 04/26/2012    Vitamin D deficiency 11/05/2010     No past surgical history on file.  Family History   Family history unknown: Yes     Social History     Tobacco Use    Smoking status: Not on file    Smokeless tobacco: Not on file   Substance Use Topics    Alcohol use: Not on file    Drug use: Not on file       Physical Exam   ED Triage Vitals [11/04/24 2056]   Temperature Heart Rate Respirations BP   36.4 °C (97.5 °F) 68 18 141/85      Pulse Ox Temp Source Heart Rate Source Patient  Position   100 % Temporal Monitor --      BP Location FiO2 (%)     -- --       Physical Exam  Vitals and nursing note reviewed.   HENT:      Head: Normocephalic and atraumatic.      Left Ear: A foreign body is present.      Nose: Nose normal.   Eyes:      Conjunctiva/sclera: Conjunctivae normal.   Cardiovascular:      Rate and Rhythm: Normal rate and regular rhythm.      Pulses: Normal pulses.      Heart sounds: Normal heart sounds.   Pulmonary:      Effort: Pulmonary effort is normal.      Breath sounds: Normal breath sounds.   Abdominal:      General: Bowel sounds are normal.      Palpations: Abdomen is soft.   Musculoskeletal:         General: Normal range of motion.      Cervical back: Normal range of motion and neck supple.   Skin:     Findings: No rash.   Neurological:      General: No focal deficit present.      Mental Status: She is alert and oriented to person, place, and time.   Psychiatric:         Mood and Affect: Mood normal.           ED Course & MDM   Diagnoses as of 11/05/24 0155   Foreign body of left ear, initial encounter   Urinary tract infection without hematuria, site unspecified   Breakthrough seizure (Multi)                 No data recorded     Candy Coma Scale Score: 15 (11/04/24 2058 : Lili Villalobos RN)                           Medical Decision Making  Differential diagnosis considered: Electrolyte abnormality, sepsis, bacteremia, UTI, breakthrough seizure, ear foreign body    This is a 49-year-old female who presents to the emergency department with multiple complaints.  On exam she has a foreign body visualized in the left TM.  Using a speculum and forceps this was easily removed.  Post removal, the TM was examined and had no perforation.  There was wax in the canal.  The patient was further evaluated with labs and urinalysis.  White blood count was low at 4.3.  Chloride was elevated at 116.  Creatinine mildly elevated at 1.15.  Urinalysis was consistent with UTI.  She was given  Bactrim.  She will follow-up as an outpatient        Procedure  Procedures     Yandel Knight MD  11/05/24 0157

## 2024-11-09 LAB
BACTERIA UR CULT: ABNORMAL
CARBA5 NEG: ABNORMAL

## 2024-11-11 ENCOUNTER — TELEPHONE (OUTPATIENT)
Dept: PHARMACY | Facility: HOSPITAL | Age: 49
End: 2024-11-11
Payer: MEDICARE

## 2024-11-11 NOTE — PROGRESS NOTES
EDPD Note: Antibiotics Reviewed and Warranted    Contacted Mr./Mrs./Ms. Neisha Simon regarding a positive urine culture/result that was taken during their recent emergency room visit. I completed education with caregiver . The patient is being treated appropriately with Bactrim SS BID x 7 days. Pt has had multiple UTIs with seizures. Patient will take last dose tomorrow. Advised patient to finish course and recommended following urology.      Susceptibility data from last 90 days.  Collected Specimen Info Organism Amoxicillin/Clavulanate Ampicillin Ampicillin/Sulbactam Cefazolin Cefepime Ciprofloxacin Gentamicin Imipenem Nitrofurantoin Piperacillin/Tazobactam Trimethoprim/Sulfamethoxazole   11/05/24 Urine from Clean Catch/Voided Klebsiella (Enterobacter) aerogenes  R  R  R  R  S  S  S  I  S  S  S        No further follow up needed from EDPD Team.     Miguelito Chaudhry RPh

## 2025-01-11 ENCOUNTER — HOSPITAL ENCOUNTER (EMERGENCY)
Facility: HOSPITAL | Age: 50
Discharge: HOME | End: 2025-01-11
Attending: INTERNAL MEDICINE
Payer: MEDICARE

## 2025-01-11 VITALS
TEMPERATURE: 99.1 F | BODY MASS INDEX: 35.73 KG/M2 | HEIGHT: 60 IN | SYSTOLIC BLOOD PRESSURE: 148 MMHG | DIASTOLIC BLOOD PRESSURE: 92 MMHG | WEIGHT: 182 LBS | RESPIRATION RATE: 15 BRPM | HEART RATE: 71 BPM | OXYGEN SATURATION: 100 %

## 2025-01-11 DIAGNOSIS — R56.9 SEIZURES (MULTI): ICD-10-CM

## 2025-01-11 DIAGNOSIS — N39.0 URINARY TRACT INFECTION WITHOUT HEMATURIA, SITE UNSPECIFIED: Primary | ICD-10-CM

## 2025-01-11 LAB
ALBUMIN SERPL BCP-MCNC: 4.2 G/DL (ref 3.4–5)
ALP SERPL-CCNC: 83 U/L (ref 33–110)
ALT SERPL W P-5'-P-CCNC: 29 U/L (ref 7–45)
ANION GAP SERPL CALC-SCNC: 11 MMOL/L (ref 10–20)
APPEARANCE UR: ABNORMAL
AST SERPL W P-5'-P-CCNC: 18 U/L (ref 9–39)
BACTERIA #/AREA URNS AUTO: ABNORMAL /HPF
BASOPHILS # BLD AUTO: 0 X10*3/UL (ref 0–0.1)
BASOPHILS NFR BLD AUTO: 0 %
BILIRUB SERPL-MCNC: 0.3 MG/DL (ref 0–1.2)
BILIRUB UR STRIP.AUTO-MCNC: NEGATIVE MG/DL
BUN SERPL-MCNC: 21 MG/DL (ref 6–23)
CALCIUM SERPL-MCNC: 8.4 MG/DL (ref 8.6–10.3)
CHLORIDE SERPL-SCNC: 115 MMOL/L (ref 98–107)
CO2 SERPL-SCNC: 21 MMOL/L (ref 21–32)
COLOR UR: ABNORMAL
CREAT SERPL-MCNC: 1.13 MG/DL (ref 0.5–1.05)
EGFRCR SERPLBLD CKD-EPI 2021: 59 ML/MIN/1.73M*2
EOSINOPHIL # BLD AUTO: 0 X10*3/UL (ref 0–0.7)
EOSINOPHIL NFR BLD AUTO: 0 %
ERYTHROCYTE [DISTWIDTH] IN BLOOD BY AUTOMATED COUNT: 12.2 % (ref 11.5–14.5)
FLUAV RNA RESP QL NAA+PROBE: NOT DETECTED
FLUBV RNA RESP QL NAA+PROBE: NOT DETECTED
GLUCOSE SERPL-MCNC: 87 MG/DL (ref 74–99)
GLUCOSE UR STRIP.AUTO-MCNC: NORMAL MG/DL
HCT VFR BLD AUTO: 44.2 % (ref 36–46)
HGB BLD-MCNC: 14.2 G/DL (ref 12–16)
IMM GRANULOCYTES # BLD AUTO: 0.01 X10*3/UL (ref 0–0.7)
IMM GRANULOCYTES NFR BLD AUTO: 0.2 % (ref 0–0.9)
KETONES UR STRIP.AUTO-MCNC: NEGATIVE MG/DL
LEUKOCYTE ESTERASE UR QL STRIP.AUTO: ABNORMAL
LYMPHOCYTES # BLD AUTO: 1.51 X10*3/UL (ref 1.2–4.8)
LYMPHOCYTES NFR BLD AUTO: 36.9 %
MAGNESIUM SERPL-MCNC: 1.81 MG/DL (ref 1.6–2.4)
MCH RBC QN AUTO: 30.2 PG (ref 26–34)
MCHC RBC AUTO-ENTMCNC: 32.1 G/DL (ref 32–36)
MCV RBC AUTO: 94 FL (ref 80–100)
MONOCYTES # BLD AUTO: 0.29 X10*3/UL (ref 0.1–1)
MONOCYTES NFR BLD AUTO: 7.1 %
NEUTROPHILS # BLD AUTO: 2.28 X10*3/UL (ref 1.2–7.7)
NEUTROPHILS NFR BLD AUTO: 55.8 %
NITRITE UR QL STRIP.AUTO: NEGATIVE
NRBC BLD-RTO: 0 /100 WBCS (ref 0–0)
PH UR STRIP.AUTO: 7 [PH]
PLATELET # BLD AUTO: 141 X10*3/UL (ref 150–450)
POTASSIUM SERPL-SCNC: 4 MMOL/L (ref 3.5–5.3)
PROT SERPL-MCNC: 6.2 G/DL (ref 6.4–8.2)
PROT UR STRIP.AUTO-MCNC: NEGATIVE MG/DL
RBC # BLD AUTO: 4.7 X10*6/UL (ref 4–5.2)
RBC # UR STRIP.AUTO: NEGATIVE /UL
RBC #/AREA URNS AUTO: ABNORMAL /HPF
SARS-COV-2 RNA RESP QL NAA+PROBE: NOT DETECTED
SODIUM SERPL-SCNC: 143 MMOL/L (ref 136–145)
SP GR UR STRIP.AUTO: 1.02
SQUAMOUS #/AREA URNS AUTO: ABNORMAL /HPF
UROBILINOGEN UR STRIP.AUTO-MCNC: NORMAL MG/DL
WBC # BLD AUTO: 4.1 X10*3/UL (ref 4.4–11.3)
WBC #/AREA URNS AUTO: ABNORMAL /HPF

## 2025-01-11 PROCEDURE — 36415 COLL VENOUS BLD VENIPUNCTURE: CPT

## 2025-01-11 PROCEDURE — 87636 SARSCOV2 & INF A&B AMP PRB: CPT

## 2025-01-11 PROCEDURE — 87186 SC STD MICRODIL/AGAR DIL: CPT | Mod: AHULAB

## 2025-01-11 PROCEDURE — 87086 URINE CULTURE/COLONY COUNT: CPT | Mod: AHULAB

## 2025-01-11 PROCEDURE — 2500000001 HC RX 250 WO HCPCS SELF ADMINISTERED DRUGS (ALT 637 FOR MEDICARE OP): Performed by: INTERNAL MEDICINE

## 2025-01-11 PROCEDURE — 81001 URINALYSIS AUTO W/SCOPE: CPT

## 2025-01-11 PROCEDURE — 80177 DRUG SCRN QUAN LEVETIRACETAM: CPT | Mod: AHULAB | Performed by: INTERNAL MEDICINE

## 2025-01-11 PROCEDURE — 85025 COMPLETE CBC W/AUTO DIFF WBC: CPT

## 2025-01-11 PROCEDURE — 99283 EMERGENCY DEPT VISIT LOW MDM: CPT | Performed by: INTERNAL MEDICINE

## 2025-01-11 PROCEDURE — 80053 COMPREHEN METABOLIC PANEL: CPT

## 2025-01-11 PROCEDURE — 83735 ASSAY OF MAGNESIUM: CPT

## 2025-01-11 PROCEDURE — 36415 COLL VENOUS BLD VENIPUNCTURE: CPT | Performed by: INTERNAL MEDICINE

## 2025-01-11 RX ORDER — CEPHALEXIN 500 MG/1
500 CAPSULE ORAL 2 TIMES DAILY
Qty: 10 CAPSULE | Refills: 0 | Status: SHIPPED | OUTPATIENT
Start: 2025-01-11 | End: 2025-01-16

## 2025-01-11 RX ORDER — CEPHALEXIN 500 MG/1
500 CAPSULE ORAL ONCE
Status: COMPLETED | OUTPATIENT
Start: 2025-01-11 | End: 2025-01-11

## 2025-01-11 RX ADMIN — CEPHALEXIN 500 MG: 500 CAPSULE ORAL at 18:32

## 2025-01-11 ASSESSMENT — PAIN SCALES - GENERAL
PAINLEVEL_OUTOF10: 0 - NO PAIN
PAINLEVEL_OUTOF10: 4

## 2025-01-11 ASSESSMENT — COLUMBIA-SUICIDE SEVERITY RATING SCALE - C-SSRS
6. HAVE YOU EVER DONE ANYTHING, STARTED TO DO ANYTHING, OR PREPARED TO DO ANYTHING TO END YOUR LIFE?: NO
1. IN THE PAST MONTH, HAVE YOU WISHED YOU WERE DEAD OR WISHED YOU COULD GO TO SLEEP AND NOT WAKE UP?: NO
2. HAVE YOU ACTUALLY HAD ANY THOUGHTS OF KILLING YOURSELF?: NO

## 2025-01-11 ASSESSMENT — PAIN - FUNCTIONAL ASSESSMENT: PAIN_FUNCTIONAL_ASSESSMENT: 0-10

## 2025-01-11 NOTE — DISCHARGE INSTRUCTIONS
The patient was seen today for concern about urinary tract infection.  She may have a UTI, we will send her urine for culture.  Will start her on antibiotics empirically.  Call her neurologist on Monday about her increased seizure frequency.  They can follow-up her Keppra level.  Their evaluation was not concerning for an emergency at this time. Please see the attached information sheet for information about the patient's condition, how to care for your their condition at home, and reasons to return to the emergency department. Give them any prescriptions written today as prescribed. You should call your their primary physician within 24 hours to tell them about today's visit, including any new medications or medication changes, as the provider want to see the patient in the office for further evaluation. If they do not have a primary physician, call  [phone] for an appointment. We offer in-person office visits as well as virtual options. Please do not hesitate to call  [phone] or return to the emergency department if the patient has any new or unresolved symptoms. Thank you for choosing [facility] for your care.

## 2025-01-11 NOTE — ED TRIAGE NOTES
Pt. To ED for possible UTI. Patient has been having recurrent seizures and frequent urination. Per legal guardian patient tends to have frequent seizures when she have a UTI. Hx seizure. Patient is developmentally delayed legal guardian present.

## 2025-01-11 NOTE — ED PROVIDER NOTES
HPI     CC: UTI     HPI: Neisha Simon is a 49 y.o. female with a history of TBI, childhood epilepsy, developmental delay, presents with increased seizure frequency and concern for UTI.  She presents with her sister/legal guardian who provides the history as patient is largely nonverbal.  She states that patient has had recurrent UTIs over the past 3 to 4 years which tend to trigger increased seizures.  She has had both generalized seizures and seizures presenting with staring over the course of her life.  She has been hospitalized both for urosepsis and status epilepticus in the past.  She has been intubated twice.  Sister states that she knows her sister well and has gotten to the point where she feels like she can tell when the UTI is coming on, wants to catch it before it becomes more severe.   over the past week she has noticed that her urine has been more foul-smelling and dark.  She has not noticed any blood, fevers or chills.  Patient has had 3 breakthrough seizures over the past week all lasting around a minute or 2 and not requiring intervention.  These have been staring spells, not generalized at all.  She is otherwise acting her usual self.  Previous to this past week she has only had about 2 seizures in the past 6 months.  She does follow regularly with neurology.  Sister is not sure the names of her medications but on chart review it looks like topiramate and Keppra.    ROS: 10-point review of systems was performed and is otherwise negative except as noted in HPI.    Limitations to history: Developmental delay/minimally verbal    Independent Historians: Sister    External Records Reviewed: Outpatient notes in EMR, prior inpatient hospital notes    Past Medical History: Noncontributory except per HPI     Past Surgical History: Noncontributory except per HPI     Family History: Reviewed and noncontributory     Social History:  Denies tobacco. Denies ETOH. Denies illicit drugs.    Social Determinants  Affecting Care: N/A    Allergies   Allergen Reactions    Dilantin [Phenytoin Sodium Extended] Unknown       Home Meds:   Current Outpatient Medications   Medication Instructions    cephalexin (KEFLEX) 500 mg, oral, 2 times daily    ciclopirox (Penlac) 8 % solution Topical, Nightly, Apply nail lacquer once daily to affected nails with applicator brush at bedtime; remove with alcohol every 7 days      cyanocobalamin (VITAMIN B-12) 1,000 mcg, oral, Daily    diazePAM (Diastat Acudial) 5-7.5-10 mg rectal kit 10 mg, rectal, As needed    docusate sodium (Colace) 100 mg capsule 1 capsule, oral, 2 times daily PRN    ergocalciferol (Vitamin D-2) 1.25 MG (58483 UT) capsule oral    levETIRAcetam (KEPPRA) 2,000 mg, oral, 2 times daily    nasal spray midazolam (Nayzilam) 5 mg/spray (0.1 mL) spray,non-aerosol 0.1 mg/kg, nasal, As needed    olive oil external oil 5 drops, Topical, 3 times weekly    primidone (MYSOLINE) 250 mg, oral, Every 12 hours    topiramate (TOPAMAX) 200 mg, oral, 2 times daily        Physical Exam     ED Triage Vitals [01/11/25 1502]   Temperature Heart Rate Respirations BP   37.3 °C (99.1 °F) 71 15 (!) 148/92      Pulse Ox Temp Source Heart Rate Source Patient Position   100 % Temporal -- --      BP Location FiO2 (%)     -- --               Physical Exam  Vitals and nursing note reviewed.     CONSTITUTIONAL: Well appearing, well nourished, in no acute distress.   HENMT: Head atraumatic. Airway patent. Nasal mucosa clear. Mouth with normal mucosa, clear oropharynx. Uvula midline. Neck supple.    EYES: Clear bilaterally, pupils equally round and reactive to light.   CARDIOVASCULAR: Normal rate, regular rhythm.  Heart sounds S1, S2.  No murmurs, rubs or gallops. Normal pulses. Capillary refill < 2 sec.   RESPIRATORY: No increased work of breathing. Breath sounds clear and equal bilaterally.  GASTROINTESTINAL: Abdomen soft, non-distended, non-tender. No rebound, no guarding. Normal bowel sounds. No palpable  masses.  GENITOURINARY:  No CVA tenderness.  MUSCULOSKELETAL: Spine appears normal, range of motion is not limited, no muscle or joint tenderness. No edema.   NEUROLOGICAL: Alert, speaks in simple sentences, no asymmetry, moving all extremities equally.  SKIN: Warm, dry and intact. No rash or notable lesions.  PSYCHIATRIC: Pleasant, cooperative  HEME/LYMPH: No adenopathy or splenomegaly.    Diagnostic Results      ECG: ECGs read and interpreted by me. See ED Course, below, for interpretation.    Labs Reviewed   CBC WITH AUTO DIFFERENTIAL - Abnormal       Result Value    WBC 4.1 (*)     nRBC 0.0      RBC 4.70      Hemoglobin 14.2      Hematocrit 44.2      MCV 94      MCH 30.2      MCHC 32.1      RDW 12.2      Platelets 141 (*)     Neutrophils % 55.8      Immature Granulocytes %, Automated 0.2      Lymphocytes % 36.9      Monocytes % 7.1      Eosinophils % 0.0      Basophils % 0.0      Neutrophils Absolute 2.28      Immature Granulocytes Absolute, Automated 0.01      Lymphocytes Absolute 1.51      Monocytes Absolute 0.29      Eosinophils Absolute 0.00      Basophils Absolute 0.00     COMPREHENSIVE METABOLIC PANEL - Abnormal    Glucose 87      Sodium 143      Potassium 4.0      Chloride 115 (*)     Bicarbonate 21      Anion Gap 11      Urea Nitrogen 21      Creatinine 1.13 (*)     eGFR 59 (*)     Calcium 8.4 (*)     Albumin 4.2      Alkaline Phosphatase 83      Total Protein 6.2 (*)     AST 18      Bilirubin, Total 0.3      ALT 29     URINALYSIS WITH REFLEX CULTURE AND MICROSCOPIC - Abnormal    Color, Urine Light-Yellow      Appearance, Urine Turbid (*)     Specific Gravity, Urine 1.022      pH, Urine 7.0      Protein, Urine NEGATIVE      Glucose, Urine Normal      Blood, Urine NEGATIVE      Ketones, Urine NEGATIVE      Bilirubin, Urine NEGATIVE      Urobilinogen, Urine Normal      Nitrite, Urine NEGATIVE      Leukocyte Esterase, Urine 500 [place]/uL (*)    MICROSCOPIC ONLY, URINE - Abnormal    WBC, Urine 1-5      RBC,  Urine 1-2      Squamous Epithelial Cells, Urine 10-25 (FEW)      Bacteria, Urine 1+ (*)    MAGNESIUM - Normal    Magnesium 1.81     SARS-COV-2 AND INFLUENZA A/B PCR - Normal    Flu A Result Not Detected      Flu B Result Not Detected      Coronavirus 2019, PCR Not Detected      Narrative:     This assay has received FDA Emergency Use Authorization (EUA) and  is only authorized for the duration of time that circumstances exist to justify the authorization of the emergency use of in vitro diagnostic tests for the detection of SARS-CoV-2 virus and/or diagnosis of COVID-19 infection under section 564(b)(1) of the Act, 21 U.S.C. 360bbb-3(b)(1). Testing for SARS-CoV-2 is only recommended for patients who meet current clinical and/or epidemiological criteria as defined by federal, state, or local public health directives. This assay is an in vitro diagnostic nucleic acid amplification test for the qualitative detection of SARS-CoV-2, Influenza A, and Influenza B from nasopharyngeal specimens and has been validated for use at [facility]. Negative results do not preclude COVID-19 infections or Influenza A/B infections, and should not be used as the sole basis for diagnosis, treatment, or other management decisions. If Influenza A/B and RSV PCR results are negative, testing for Parainfluenza virus, Adenovirus and Metapneumovirus is routinely performed for [facility] pediatric oncology and intensive care inpatients, and is available on other patients by placing an add-on request.    LEVETIRACETAM - Normal    Keppra 40      Narrative:     Brivaracetam may falsely increase the amount of Levetiracetam measured by this method. Serum levels should be confirmed by a valid chromatographic method  for patients with these drugs co-present in circulation.   URINE CULTURE   URINALYSIS WITH REFLEX CULTURE AND MICROSCOPIC    Narrative:     The following orders were created for panel order Urinalysis with Reflex Culture and  Microscopic.  Procedure                               Abnormality         Status                     ---------                               -----------         ------                     Urinalysis with Reflex C...[[id number]]  Abnormal            Final result               Extra Urine Gray Tube[[id number]]                            Final result                 Please view results for these tests on the individual orders.   EXTRA URINE GRAY TUBE    Extra Tube Hold for add-ons.           No orders to display                 [place] Coma Scale Score: 15         NIH Stroke Scale: 0          Procedure  Procedures    ED Course & MDM   Assessment/Plan:   Neisha Simon is a 49 y.o. female with a history of TBI, childhood epilepsy, developmental delay, presents with sister for increased seizure frequency and concern for UTI.  History is noticed increased malodorous and dark urine over the past week and patient has had 3 brief breakthrough seizures which is typical of when she gets a UTI.  Patient is hemodynamically stable and generally well-appearing, at her mental status baseline according to sister.  Will screen for infectious or metabolic etiologies for increased seizure frequency, namely for UTI.  Will check Keppra level. See below for details of ED course and ultimate disposition.    Medications   cephalexin (Keflex) capsule 500 mg (500 mg oral Given 1/11/25 1832)        ED Course as of 01/12/25 1602   Sat Jan 11, 2025   1830 Labs are notable for CBC with stable leukopenia 4.1, stable thrombocytopenia 141, CMP with stably elevated creatinine 1.13, normal LFTs, normal electrolytes except for mildly elevated chloride 115, normal magnesium, negative viral swabs, urinalysis possibly consistent with UTI with leukocyte esterase and 1+ bacteria although her nitrites are negative and she only has 1-5 WBCs.  Keppra level is pending.  Will treat empirically for UTI with 5 days of Keflex given that sister feels patient is  symptomatic.  She was advised to call patient's neurologist on Monday to further discuss her antiepileptics and follow-up Keppra level.  Patient was discharged home with anticipatory guidance and strict return precautions. [CG]      ED Course User Index  [CG] Amy Soria MD         Diagnoses as of 01/12/25 1602   Urinary tract infection without hematuria, site unspecified   Seizures (Multi)       Disposition:   DISCHARGE.  The patient was discharged with both verbal and written anticipatory guidance and strict return precautions. Discharge diagnosis, instructions and plan were discussed and understood. I emphasized the importance of following up with their primary care provider within 24-48 hours and with any referrals in the timeframe recommended. At the time of discharge the patient was comfortable and was in no apparent distress. Patient is aware of diagnostic uncertainty and was notified though testing is negative here, there is a very small chance that pathology may be missed.  The patient understands these risks and the patient/family understood to call 911 or return immediately to the emergency department if the symptoms worsen or if they have any additional concerns.      FOLLOW UP  Primary care provider in 1-2 days.      ED Prescriptions       Medication Sig Dispense Start Date End Date Auth. Provider    cephalexin (Keflex) 500 mg capsule Take 1 capsule (500 mg) by mouth 2 times a day for 5 days. 10 capsule 1/11/2025 1/16/2025 MD Amy Varner MD  EM/IM/Peds    This note was dictated by speech recognition. Minor errors in transcription may be present.     Amy Soria MD  01/12/25 1602

## 2025-01-11 NOTE — ED TRIAGE NOTES
TRIAGE NOTE   I saw the patient as the Clinician in Triage and performed a brief history and physical exam, established acuity, and ordered appropriate tests to develop basic plan of care. Patient will be seen by an [facility], resident and/or physician who will independently evaluate the patient. Please see subsequent provider notes for further details and disposition.     Brief HPI: In brief, Neisha Simon is a 49 y.o. female that presents for UTIs.  Patient is accompanied by her caregiver.  States that she has had 3 seizures over the past couple days.  Reports that usually when this happens she has a UTI.  She reports dark urine but no other urinary symptoms.  No fevers.  No nausea or vomiting.  Still acting normal per guardian.  No seizures over 5 minutes.    Focused Physical exam:   Abdomen soft nontender nondistended  Patient sitting in the room in no acute distress  Plan/MDM:   Initiating basic labs, urinalysis and urine drug screen as well as magnesium.  Patient will be seen in the back of the ED for further evaluation and treatment.  I will not be phone with this patient during her ED visit.  This is a preliminary evaluation during triage.    I evaluated this patient in triage with the RN. Due to the patients complaint labs and or imaging were ordered by myself in an attempt to expedite patient care however I am not participating in care after evaluation. This is a preliminary assessment. Pt does not appear in acute distress at this time. They will have a full evaluation as soon as possible. They will be cared for by another provider who will possibly order more labs, imaging or interventions. Pt did not have a full ROS or PE completed by myself however below is a summary with reasons for orders.  For the remainder of the patient's workup and ED course, please refer to the main ED provider note. We discussed need for diagnostic testing including laboratory studies and imaging.  We also discussed that patient  may be asked to wait in the waiting room while these tests are pending.  They understand that if they choose to leave without having the testing completed or resulted that we cannot rule out acute life threatening illnesses and the risks involved could lead to worsening condition, permanent disability or even death.     Please see subsequent provider note for further details and disposition

## 2025-01-12 LAB
HOLD SPECIMEN: NORMAL
LEVETIRACETAM SERPL-MCNC: 40 UG/ML (ref 10–40)

## 2025-01-14 LAB — BACTERIA UR CULT: ABNORMAL

## 2025-01-15 ENCOUNTER — TELEPHONE (OUTPATIENT)
Dept: PHARMACY | Facility: HOSPITAL | Age: 50
End: 2025-01-15
Payer: MEDICARE

## 2025-01-15 NOTE — PROGRESS NOTES
EDPD Note: Antibiotics Reviewed and Warranted    Contacted Mr./Mrs./Ms. Neisha Simon regarding a positive urine culture/result that was taken during their recent emergency room visit. I completed education with patient . The patient is being treated appropriately with cephalexin 500 mg twice daily for 5 days.     Patient presented to the ED 1/11 with symptoms of seizure, dark / malodorous urine. Patient has a history of seizures exacerbated by UTI. No noted dysuria, urinary frequency and urgency. Patient discharged with cephalexin 500 mg twice daily for 5 days. At this time, patient's dark urine has resolved however it appropriate to continue at same dose and duration given patients history of seizures induced by UTI.    Susceptibility data from last 90 days.  Collected Specimen Info Organism Amoxicillin/Clavulanate Ampicillin Ampicillin/Sulbactam Cefazolin Cefazolin (uncomplicated UTIs only) Cefepime Ciprofloxacin Gentamicin Imipenem Nitrofurantoin Piperacillin/Tazobactam   01/11/25 Urine from Clean Catch/Voided Proteus mirabilis   S   S  S   S  S   R  S   11/05/24 Urine from Clean Catch/Voided Klebsiella (Enterobacter) aerogenes  R  R  R  R   S  S  S  I  S  S     Collected Specimen Info Organism Tetracycline Trimethoprim/Sulfamethoxazole   01/11/25 Urine from Clean Catch/Voided Proteus mirabilis  R  S   11/05/24 Urine from Clean Catch/Voided Klebsiella (Enterobacter) aerogenes   S        No further follow up needed from EDPD Team.     Vladimir Howard, PharmD

## 2025-07-06 DIAGNOSIS — R56.9 SEIZURE (MULTI): ICD-10-CM

## 2025-07-09 RX ORDER — TOPIRAMATE 200 MG/1
200 TABLET, FILM COATED ORAL 2 TIMES DAILY
Qty: 180 TABLET | Refills: 1 | Status: SHIPPED | OUTPATIENT
Start: 2025-07-09

## 2025-09-23 ENCOUNTER — APPOINTMENT (OUTPATIENT)
Dept: NEUROLOGY | Facility: CLINIC | Age: 50
End: 2025-09-23
Payer: MEDICARE

## 2025-11-17 ENCOUNTER — APPOINTMENT (OUTPATIENT)
Dept: NEUROLOGY | Facility: HOSPITAL | Age: 50
End: 2025-11-17
Payer: MEDICARE